# Patient Record
Sex: MALE | Race: WHITE | NOT HISPANIC OR LATINO | URBAN - METROPOLITAN AREA
[De-identification: names, ages, dates, MRNs, and addresses within clinical notes are randomized per-mention and may not be internally consistent; named-entity substitution may affect disease eponyms.]

---
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
## 2019-05-03 ENCOUNTER — APPOINTMENT (OUTPATIENT)
Dept: DERMATOLOGY | Facility: CLINIC | Age: 19
End: 2019-05-03
Payer: COMMERCIAL

## 2019-05-03 DIAGNOSIS — L70.0 ACNE VULGARIS: ICD-10-CM

## 2019-05-03 PROCEDURE — 99213 OFFICE O/P EST LOW 20 MIN: CPT

## 2019-05-03 NOTE — HISTORY OF PRESENT ILLNESS
[de-identified] : Acne;  occasional outbreaks, but less;\par doing well on aczone/tretinoin;   BId

## 2019-05-14 ENCOUNTER — MEDICATION RENEWAL (OUTPATIENT)
Age: 19
End: 2019-05-14

## 2019-05-14 RX ORDER — MINOCYCLINE HYDROCHLORIDE 100 MG/1
100 CAPSULE ORAL
Qty: 60 | Refills: 5 | Status: ACTIVE | COMMUNITY
Start: 2018-07-24 | End: 1900-01-01

## 2019-08-05 ENCOUNTER — APPOINTMENT (OUTPATIENT)
Dept: DERMATOLOGY | Facility: CLINIC | Age: 19
End: 2019-08-05

## 2020-08-01 ENCOUNTER — TRANSCRIPTION ENCOUNTER (OUTPATIENT)
Age: 20
End: 2020-08-01

## 2020-08-12 ENCOUNTER — TRANSCRIPTION ENCOUNTER (OUTPATIENT)
Age: 20
End: 2020-08-12

## 2022-04-13 ENCOUNTER — EMERGENCY (EMERGENCY)
Facility: HOSPITAL | Age: 22
LOS: 0 days | Discharge: HOME | End: 2022-04-13
Attending: HOSPITALIST
Payer: COMMERCIAL

## 2022-04-13 VITALS — HEIGHT: 70 IN | WEIGHT: 175.05 LBS

## 2022-04-13 VITALS
HEART RATE: 85 BPM | RESPIRATION RATE: 17 BRPM | TEMPERATURE: 98 F | DIASTOLIC BLOOD PRESSURE: 66 MMHG | OXYGEN SATURATION: 99 % | SYSTOLIC BLOOD PRESSURE: 117 MMHG

## 2022-04-13 DIAGNOSIS — Z20.822 CONTACT WITH AND (SUSPECTED) EXPOSURE TO COVID-19: ICD-10-CM

## 2022-04-13 DIAGNOSIS — J03.90 ACUTE TONSILLITIS, UNSPECIFIED: ICD-10-CM

## 2022-04-13 DIAGNOSIS — J02.9 ACUTE PHARYNGITIS, UNSPECIFIED: ICD-10-CM

## 2022-04-13 LAB
ADD ON TEST-SPECIMEN IN LAB: SIGNIFICANT CHANGE UP
ALBUMIN SERPL ELPH-MCNC: 4.5 G/DL — SIGNIFICANT CHANGE UP (ref 3.3–5)
ALP SERPL-CCNC: 148 U/L — HIGH (ref 40–120)
ALT FLD-CCNC: 110 U/L — HIGH (ref 12–78)
ANION GAP SERPL CALC-SCNC: 6 MMOL/L — SIGNIFICANT CHANGE UP (ref 5–17)
AST SERPL-CCNC: 56 U/L — HIGH (ref 15–37)
BASOPHILS # BLD AUTO: 0.13 K/UL — SIGNIFICANT CHANGE UP (ref 0–0.2)
BASOPHILS NFR BLD AUTO: 1 % — SIGNIFICANT CHANGE UP (ref 0–2)
BILIRUB SERPL-MCNC: 0.8 MG/DL — SIGNIFICANT CHANGE UP (ref 0.2–1.2)
BUN SERPL-MCNC: 13 MG/DL — SIGNIFICANT CHANGE UP (ref 7–23)
CALCIUM SERPL-MCNC: 10.1 MG/DL — SIGNIFICANT CHANGE UP (ref 8.5–10.1)
CHLORIDE SERPL-SCNC: 100 MMOL/L — SIGNIFICANT CHANGE UP (ref 96–108)
CO2 SERPL-SCNC: 29 MMOL/L — SIGNIFICANT CHANGE UP (ref 22–31)
CREAT SERPL-MCNC: 1.21 MG/DL — SIGNIFICANT CHANGE UP (ref 0.5–1.3)
EGFR: 87 ML/MIN/1.73M2 — SIGNIFICANT CHANGE UP
EOSINOPHIL # BLD AUTO: 0 K/UL — SIGNIFICANT CHANGE UP (ref 0–0.5)
EOSINOPHIL NFR BLD AUTO: 0 % — SIGNIFICANT CHANGE UP (ref 0–6)
FLUAV AG NPH QL: SIGNIFICANT CHANGE UP
FLUBV AG NPH QL: SIGNIFICANT CHANGE UP
GLUCOSE SERPL-MCNC: 98 MG/DL — SIGNIFICANT CHANGE UP (ref 70–99)
HCT VFR BLD CALC: 47 % — SIGNIFICANT CHANGE UP (ref 39–50)
HGB BLD-MCNC: 16 G/DL — SIGNIFICANT CHANGE UP (ref 13–17)
LYMPHOCYTES # BLD AUTO: 1.18 K/UL — SIGNIFICANT CHANGE UP (ref 1–3.3)
LYMPHOCYTES # BLD AUTO: 9 % — LOW (ref 13–44)
MCHC RBC-ENTMCNC: 30.1 PG — SIGNIFICANT CHANGE UP (ref 27–34)
MCHC RBC-ENTMCNC: 34 GM/DL — SIGNIFICANT CHANGE UP (ref 32–36)
MCV RBC AUTO: 88.5 FL — SIGNIFICANT CHANGE UP (ref 80–100)
MONOCYTES # BLD AUTO: 2.09 K/UL — HIGH (ref 0–0.9)
MONOCYTES NFR BLD AUTO: 16 % — HIGH (ref 2–14)
NEUTROPHILS # BLD AUTO: 5.62 K/UL — SIGNIFICANT CHANGE UP (ref 1.8–7.4)
NEUTROPHILS NFR BLD AUTO: 39 % — LOW (ref 43–77)
NRBC # BLD: SIGNIFICANT CHANGE UP /100 WBCS (ref 0–0)
PLATELET # BLD AUTO: 239 K/UL — SIGNIFICANT CHANGE UP (ref 150–400)
POTASSIUM SERPL-MCNC: 4.6 MMOL/L — SIGNIFICANT CHANGE UP (ref 3.5–5.3)
POTASSIUM SERPL-SCNC: 4.6 MMOL/L — SIGNIFICANT CHANGE UP (ref 3.5–5.3)
PROT SERPL-MCNC: 9.8 GM/DL — HIGH (ref 6–8.3)
RBC # BLD: 5.31 M/UL — SIGNIFICANT CHANGE UP (ref 4.2–5.8)
RBC # FLD: 12.9 % — SIGNIFICANT CHANGE UP (ref 10.3–14.5)
RSV RNA NPH QL NAA+NON-PROBE: SIGNIFICANT CHANGE UP
S PYO AG SPEC QL IA: NEGATIVE — SIGNIFICANT CHANGE UP
SARS-COV-2 RNA SPEC QL NAA+PROBE: SIGNIFICANT CHANGE UP
SODIUM SERPL-SCNC: 135 MMOL/L — SIGNIFICANT CHANGE UP (ref 135–145)
WBC # BLD: 13.07 K/UL — HIGH (ref 3.8–10.5)
WBC # FLD AUTO: 13.07 K/UL — HIGH (ref 3.8–10.5)

## 2022-04-13 PROCEDURE — 0241U: CPT

## 2022-04-13 PROCEDURE — 96374 THER/PROPH/DIAG INJ IV PUSH: CPT

## 2022-04-13 PROCEDURE — 36415 COLL VENOUS BLD VENIPUNCTURE: CPT

## 2022-04-13 PROCEDURE — 70491 CT SOFT TISSUE NECK W/DYE: CPT | Mod: MA

## 2022-04-13 PROCEDURE — 85025 COMPLETE CBC W/AUTO DIFF WBC: CPT

## 2022-04-13 PROCEDURE — 80053 COMPREHEN METABOLIC PANEL: CPT

## 2022-04-13 PROCEDURE — 99284 EMERGENCY DEPT VISIT MOD MDM: CPT | Mod: 25

## 2022-04-13 PROCEDURE — 86665 EPSTEIN-BARR CAPSID VCA: CPT

## 2022-04-13 PROCEDURE — 87081 CULTURE SCREEN ONLY: CPT

## 2022-04-13 PROCEDURE — 99285 EMERGENCY DEPT VISIT HI MDM: CPT

## 2022-04-13 PROCEDURE — 87880 STREP A ASSAY W/OPTIC: CPT

## 2022-04-13 PROCEDURE — 70491 CT SOFT TISSUE NECK W/DYE: CPT | Mod: 26,MA

## 2022-04-13 PROCEDURE — 86663 EPSTEIN-BARR ANTIBODY: CPT

## 2022-04-13 PROCEDURE — 86664 EPSTEIN-BARR NUCLEAR ANTIGEN: CPT

## 2022-04-13 PROCEDURE — 96375 TX/PRO/DX INJ NEW DRUG ADDON: CPT

## 2022-04-13 RX ORDER — PIPERACILLIN AND TAZOBACTAM 4; .5 G/20ML; G/20ML
3.38 INJECTION, POWDER, LYOPHILIZED, FOR SOLUTION INTRAVENOUS ONCE
Refills: 0 | Status: COMPLETED | OUTPATIENT
Start: 2022-04-13 | End: 2022-04-13

## 2022-04-13 RX ORDER — ACETAMINOPHEN 500 MG
2 TABLET ORAL
Qty: 0 | Refills: 0 | DISCHARGE

## 2022-04-13 RX ORDER — SODIUM CHLORIDE 9 MG/ML
2000 INJECTION INTRAMUSCULAR; INTRAVENOUS; SUBCUTANEOUS ONCE
Refills: 0 | Status: COMPLETED | OUTPATIENT
Start: 2022-04-13 | End: 2022-04-13

## 2022-04-13 RX ORDER — DEXAMETHASONE 0.5 MG/5ML
10 ELIXIR ORAL ONCE
Refills: 0 | Status: COMPLETED | OUTPATIENT
Start: 2022-04-13 | End: 2022-04-13

## 2022-04-13 RX ORDER — KETOROLAC TROMETHAMINE 30 MG/ML
30 SYRINGE (ML) INJECTION ONCE
Refills: 0 | Status: DISCONTINUED | OUTPATIENT
Start: 2022-04-13 | End: 2022-04-13

## 2022-04-13 RX ORDER — LORATADINE 10 MG/1
1 TABLET ORAL
Qty: 0 | Refills: 0 | DISCHARGE

## 2022-04-13 RX ADMIN — Medication 30 MILLIGRAM(S): at 19:36

## 2022-04-13 RX ADMIN — PIPERACILLIN AND TAZOBACTAM 200 GRAM(S): 4; .5 INJECTION, POWDER, LYOPHILIZED, FOR SOLUTION INTRAVENOUS at 19:40

## 2022-04-13 RX ADMIN — SODIUM CHLORIDE 2000 MILLILITER(S): 9 INJECTION INTRAMUSCULAR; INTRAVENOUS; SUBCUTANEOUS at 19:31

## 2022-04-13 RX ADMIN — Medication 30 MILLIGRAM(S): at 20:00

## 2022-04-13 RX ADMIN — Medication 102 MILLIGRAM(S): at 19:28

## 2022-04-13 NOTE — ED STATDOCS - PROGRESS NOTE DETAILS
22 y/o Male presents to ED with c/o sore throat with difficulty swallowing.  Pt was seen in Urgent Care where rapid strep was (+).  Given PO Decadron and Augmentin without relief.  Pt unable to swallow antibiotics.   Feels voice is muffled.  Neg drooling.  On exam, Oropharynx erythematous with 3-4 (+) tonsils with exudates bilat.  Uvula midline and rises.  Will f/u Labs,CT and re-eval s/p meds.  Yolanda Bray PA-C Tiburcio LOO: patient initially still c/o pain and inability to swallow fluids. discussed admission and transfer to [facility] for ENT eval. initially agreed. spoke with transfer center and ENT resident to arrange for transfer. but then after lengthy discussion with parents, patient and parents decided to wait and try to po challenge again. was able to tolerate jello and arrange for ENT f/u in the morning tomorrow. will dc. called transfer center back to update.

## 2022-04-13 NOTE — ED STATDOCS - NSFOLLOWUPINSTRUCTIONS_ED_ALL_ED_FT
You can take ibuprofen as needed for pain/swelling, 600mg every 6-8 hours, take with food.  Please follow up with ENT tomorrow as we discussed.

## 2022-04-13 NOTE — ED STATDOCS - CLINICAL SUMMARY MEDICAL DECISION MAKING FREE TEXT BOX
21 M with severe tonsillitis and odynophagia. unable to tolerate PO intake. will give abx, fluids, steroids, pain control , CT neck and soft tissue to r/o abscess, reassess and PO challenge

## 2022-04-13 NOTE — ED STATDOCS - OBJECTIVE STATEMENT
21 M no hx here c/o sore throat x 3 days, in known setting of testing positive for strep yesterday. pt states he was seen at urgent care and given Decadron and was prescribed Augmentin. pt states that he is unable ot swallow his saliva and unable to swallow the Augmentin pills . pt's father states that pt's voice sounds muffled. pt sent in the ED due to concern for a retropharyngeal abscess. of note, pt states that he had strep twice before and was treated with Amoxicillin. ENT: Dr. Wise

## 2022-04-13 NOTE — ED STATDOCS - ENMT, MLM
Nasal mucosa clear.  hoarseness of voice. large almost kissing tonsils b/l. no neck rigidity full ROM. not drooling. Mouth with normal mucosa  Throat has no vesicles, no oropharyngeal exudates and uvula is midline.

## 2022-04-13 NOTE — ED STATDOCS - PATIENT PORTAL LINK FT
You can access the FollowMyHealth Patient Portal offered by [facility] by registering at the following website: [email or web address]. By joining [employer]’s FollowMyHealth portal, you will also be able to view your health information using other applications (apps) compatible with our system.

## 2022-04-13 NOTE — ED STATDOCS - NS ED ATTENDING STATEMENT MOD
This was a shared visit with the FRANKO. I reviewed and verified the documentation and independently performed the documented:

## 2022-04-13 NOTE — ED ADULT TRIAGE NOTE - CHIEF COMPLAINT QUOTE
Pt presents to er with complaints of being tested strep pos and sore throat is worse today, states [facility] sent him in for evaluation at this time.

## 2022-04-13 NOTE — PHARMACOTHERAPY INTERVENTION NOTE - COMMENTS
Medication reconciliation completed.  Reviewed Medication list and confirmed med allergies with patient; confirmed with Dr. First Medelsie.

## 2022-04-13 NOTE — ED STATDOCS - NS_ ATTENDINGSCRIBEDETAILS _ED_A_ED_FT
Mary Dey MD: The history, relevant review of systems, past medical and surgical history, medical decision making, and physical examination was documented by the scribe in my presence and I attest to the accuracy of the documentation.

## 2022-04-14 ENCOUNTER — INPATIENT (INPATIENT)
Facility: HOSPITAL | Age: 22
LOS: 3 days | Discharge: HOME | End: 2022-04-18
Attending: HOSPITALIST | Admitting: HOSPITALIST
Payer: COMMERCIAL

## 2022-04-14 VITALS
DIASTOLIC BLOOD PRESSURE: 65 MMHG | HEIGHT: 70 IN | TEMPERATURE: 99 F | HEART RATE: 125 BPM | SYSTOLIC BLOOD PRESSURE: 136 MMHG | OXYGEN SATURATION: 100 % | RESPIRATION RATE: 20 BRPM

## 2022-04-14 DIAGNOSIS — B27.90 INFECTIOUS MONONUCLEOSIS, UNSPECIFIED WITHOUT COMPLICATION: ICD-10-CM

## 2022-04-14 DIAGNOSIS — J03.90 ACUTE TONSILLITIS, UNSPECIFIED: ICD-10-CM

## 2022-04-14 LAB
ALBUMIN SERPL ELPH-MCNC: 4.4 G/DL — SIGNIFICANT CHANGE UP (ref 3.3–5)
ALP SERPL-CCNC: 118 U/L — SIGNIFICANT CHANGE UP (ref 40–120)
ALT FLD-CCNC: 70 U/L — HIGH (ref 4–41)
ANION GAP SERPL CALC-SCNC: 13 MMOL/L — SIGNIFICANT CHANGE UP (ref 7–14)
AST SERPL-CCNC: 46 U/L — HIGH (ref 4–40)
BASOPHILS # BLD AUTO: 0 K/UL — SIGNIFICANT CHANGE UP (ref 0–0.2)
BASOPHILS NFR BLD AUTO: 0 % — SIGNIFICANT CHANGE UP (ref 0–2)
BILIRUB SERPL-MCNC: 0.6 MG/DL — SIGNIFICANT CHANGE UP (ref 0.2–1.2)
BUN SERPL-MCNC: 11 MG/DL — SIGNIFICANT CHANGE UP (ref 7–23)
CALCIUM SERPL-MCNC: 9.4 MG/DL — SIGNIFICANT CHANGE UP (ref 8.4–10.5)
CHLORIDE SERPL-SCNC: 100 MMOL/L — SIGNIFICANT CHANGE UP (ref 98–107)
CO2 SERPL-SCNC: 24 MMOL/L — SIGNIFICANT CHANGE UP (ref 22–31)
CREAT SERPL-MCNC: 1.01 MG/DL — SIGNIFICANT CHANGE UP (ref 0.5–1.3)
EGFR: 108 ML/MIN/1.73M2 — SIGNIFICANT CHANGE UP
EOSINOPHIL # BLD AUTO: 0 K/UL — SIGNIFICANT CHANGE UP (ref 0–0.5)
EOSINOPHIL NFR BLD AUTO: 0 % — SIGNIFICANT CHANGE UP (ref 0–6)
GLUCOSE SERPL-MCNC: 95 MG/DL — SIGNIFICANT CHANGE UP (ref 70–99)
HCT VFR BLD CALC: 41.7 % — SIGNIFICANT CHANGE UP (ref 39–50)
HGB BLD-MCNC: 14.4 G/DL — SIGNIFICANT CHANGE UP (ref 13–17)
IANC: 4.34 K/UL — SIGNIFICANT CHANGE UP (ref 1.8–7.4)
LYMPHOCYTES # BLD AUTO: 41.6 % — SIGNIFICANT CHANGE UP (ref 13–44)
LYMPHOCYTES # BLD AUTO: 5.66 K/UL — HIGH (ref 1–3.3)
MCHC RBC-ENTMCNC: 30.4 PG — SIGNIFICANT CHANGE UP (ref 27–34)
MCHC RBC-ENTMCNC: 34.5 GM/DL — SIGNIFICANT CHANGE UP (ref 32–36)
MCV RBC AUTO: 88 FL — SIGNIFICANT CHANGE UP (ref 80–100)
MONOCYTES # BLD AUTO: 1.09 K/UL — HIGH (ref 0–0.9)
MONOCYTES NFR BLD AUTO: 8 % — SIGNIFICANT CHANGE UP (ref 2–14)
NEUTROPHILS # BLD AUTO: 6.26 K/UL — SIGNIFICANT CHANGE UP (ref 1.8–7.4)
NEUTROPHILS NFR BLD AUTO: 46 % — SIGNIFICANT CHANGE UP (ref 43–77)
PLATELET # BLD AUTO: 206 K/UL — SIGNIFICANT CHANGE UP (ref 150–400)
POTASSIUM SERPL-MCNC: 3.9 MMOL/L — SIGNIFICANT CHANGE UP (ref 3.5–5.3)
POTASSIUM SERPL-SCNC: 3.9 MMOL/L — SIGNIFICANT CHANGE UP (ref 3.5–5.3)
PROT SERPL-MCNC: 7.9 G/DL — SIGNIFICANT CHANGE UP (ref 6–8.3)
RBC # BLD: 4.74 M/UL — SIGNIFICANT CHANGE UP (ref 4.2–5.8)
RBC # FLD: 12.9 % — SIGNIFICANT CHANGE UP (ref 10.3–14.5)
SODIUM SERPL-SCNC: 137 MMOL/L — SIGNIFICANT CHANGE UP (ref 135–145)
WBC # BLD: 13.6 K/UL — HIGH (ref 3.8–10.5)
WBC # FLD AUTO: 13.6 K/UL — HIGH (ref 3.8–10.5)

## 2022-04-14 PROCEDURE — 99223 1ST HOSP IP/OBS HIGH 75: CPT

## 2022-04-14 PROCEDURE — 99285 EMERGENCY DEPT VISIT HI MDM: CPT

## 2022-04-14 RX ORDER — DEXAMETHASONE 0.5 MG/5ML
10 ELIXIR ORAL ONCE
Refills: 0 | Status: COMPLETED | OUTPATIENT
Start: 2022-04-14 | End: 2022-04-14

## 2022-04-14 RX ORDER — KETOROLAC TROMETHAMINE 30 MG/ML
15 SYRINGE (ML) INJECTION ONCE
Refills: 0 | Status: DISCONTINUED | OUTPATIENT
Start: 2022-04-14 | End: 2022-04-14

## 2022-04-14 RX ORDER — SODIUM CHLORIDE 9 MG/ML
1000 INJECTION, SOLUTION INTRAVENOUS
Refills: 0 | Status: DISCONTINUED | OUTPATIENT
Start: 2022-04-14 | End: 2022-04-16

## 2022-04-14 RX ADMIN — Medication 102 MILLIGRAM(S): at 16:07

## 2022-04-14 RX ADMIN — Medication 15 MILLIGRAM(S): at 16:07

## 2022-04-14 RX ADMIN — SODIUM CHLORIDE 75 MILLILITER(S): 9 INJECTION, SOLUTION INTRAVENOUS at 22:04

## 2022-04-14 NOTE — ED PROVIDER NOTE - PROGRESS NOTE DETAILS
Nik PGY3: ENT scoped at bedside, no c/f epiglottitis but states tonsillar edema will take time to go down and should be admitted to medicine for monitoring.

## 2022-04-14 NOTE — ED ADULT NURSE NOTE - OBJECTIVE STATEMENT
PT came to ED c/o pain and swelling to tonsils.  PT A&OX4.  PT's mom stated he went to [facility] last night to the same symptoms, was given steroids and IVF, then d/c.  Swelling and pain got worse today so PT came here.  No SOB noted.  PT able to move all extremities.  Resp WDL.  Will continue to monitor. PT came to ED c/o pain and swelling to tonsils.  PT A&OX4.  PT's mom stated he went to [facility] last night to the same symptoms, was given steroids and IVF, then d/c.  Swelling and pain got worse today so PT came here.  PT noted with white swelling in tonsils.  PT c/o pain to tonsils, stated he has difficulty swallowing food and drinks.  No SOB noted.  PT able to move all extremities.  Resp WDL.  Left 20g IVL in place and tolerating well.  Will continue to monitor.

## 2022-04-14 NOTE — ED PROVIDER NOTE - OBJECTIVE STATEMENT
20 y/o M w/ [facility] p/w tonsillar swelling x 2 days. Patient seen at [place] yestrday, CT shows enlarged tonsils w/ b/l edema. Patient was given steroids in ED and was told that he might have to be admitted. Patient opted not to, tolerated PO and went home. Awoke feeling worse, and came into ER. Patient endorses sore throat, fever and chills. No cp, or sob.

## 2022-04-14 NOTE — ED ADULT TRIAGE NOTE - ARRIVAL FROM
[facility]                                                                       Query Response Note      PATIENT:               MEGHAN VILLELA  ACCT #:                  [id number]  MRN:                     [id number]  :                      1958  ADMIT DATE:       3/24/2020 1:16 PM  DISCH DATE:        2020 3:01 PM  RESPONDING  PROVIDER #:        [id number]           QUERY TEXT:    Pt has documented __Sepsis_ noted in Progress Notes 3/26 and 3/27/2020.  Please clarify status of this condition:    NOTE:  If an appropriate response is not listed below, please respond with a new note.       The patient's Clinical Indicators include:  Admitted with HIV not on meds for at least 2 years. Chest xray concerning for PJP pneumonia.  Progress note 3/26/2020 states: has shortness of breath coughing and found to be febrile.  Presumed PJP pneumonia  Sepsis  Acute hypoxemic respiratory failure.    Temp 35.8  / Pulse 93  /   Resp 20  /  BP  106/73  WBC 5,300  IV  Unasyn and azithromycin  Options provided:   -- Sepsis ruled in, present on admit   -- Sepsis ruled in, developed after admit   -- Sepsis ruled out   -- Unable to determine      Query created by: Yasmin Hendrickson on 2020 6:13 PM    RESPONSE TEXT:    Sepsis ruled in, present on admit          Electronically signed by:  MIMI ABDULLAHI MD 2020 7:17 PM              
                                                                         [facility]                                                                       Query Response Note      PATIENT:               MEGHAN VILLELA  ACCT #:                  [id number]  MRN:                     [id number]  :                      1958  ADMIT DATE:       3/24/2020 1:16 PM  DISCH DATE:        2020 3:01 PM  RESPONDING  PROVIDER #:        [id number]           QUERY TEXT:    Please clarify in documentation the relationship, if any, between Sepsis and AIDS.    *If an appropriate response is not listed below, please respond with a new note:    The patient's Clinical Indicators include:  61 y.o. with history of AIDS admitted for evaluation of progressive malaise.and fatigue associated with significant weight loss of about 100 pounds over the past 6 months.  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  Sepsis documented in Progress Note 3/26/2020.  Has oropharyngeal candidiasis being treated on fluconazole and nystatin.  Options provided:   -- Sepsis is due to or associated with AIDS   -- Unrelated to each other   -- Unable to determine      Query created by: Yasmin Hendrickson on 2020 2:46 PM    RESPONSE TEXT:    Sepsis is due to or associated with AIDS  Sepsis is secondary to pneumocystis jirovecii pneumonia, but AIDS puts him at high risk of opportunistic infections like pneumocystis jirovecii pneumonia          Electronically signed by:  MIMI ABDULLAHI MD 2020 3:05 PM              
Home

## 2022-04-14 NOTE — CONSULT NOTE ADULT - SUBJECTIVE AND OBJECTIVE BOX
CC: swollen tonsils    HPI: 20 y/o male w no significant pmhx presented to [facility] ED for tonsilar swelling x 2 days. Yesterday pt went to [facility] ED  and CT shows enlarged tonsils w/ b/l edema. Patient was given steroids at [facility] ED and was told that he might have to be admitted. Patient opted not to, tolerated PO and went home. Pt went to [facility] ED for worsting of symptoms.  At [facility] ED pt tachycardic 125-118, tmax 99.7, normotensive and 100% on RA  positive for EBV. ENT consulted for r/o epiglottis. Pt seen and examined at bedside. Pt expressed feeling short of breath.  Pt endorsed pain in throat, chills/fever, inability to swallow food and spitting up secretions.        PAST MEDICAL & SURGICAL HISTORY:  No pertinent past medical history      Allergies    No Known Allergies    Intolerances      MEDICATIONS  (STANDING):    MEDICATIONS  (PRN):      Social History: No known sick contacts     Family history: No pertinent family history in first degree relatives    ROS:   ENT: all negative except as noted in HPI   CV: denies palpitations  Pulm: denies cough, hemoptysis  GI: denies indigestion, n/v  : denies pertinent urinary symptoms, urgency  Neuro: denies numbness/tingling, loss of sensation  Psych: denies anxiety  MS: denies muscle weakness, instability  Heme: denies easy bruising or bleeding  Endo: denies heat/cold intolerance, excessive sweating  Vascular: denies LE edema    Vital Signs Last 24 Hrs  T(C): 37.6 (14 Apr 2022 15:44), Max: 37.6 (14 Apr 2022 15:44)  T(F): 99.7 (14 Apr 2022 15:44), Max: 99.7 (14 Apr 2022 15:44)  HR: 118 (14 Apr 2022 15:44) (85 - 125)  BP: 136/84 (14 Apr 2022 15:44) (117/66 - 136/84)  BP(mean): 99 (13 Apr 2022 17:15) (99 - 99)  RR: 18 (14 Apr 2022 15:44) (17 - 20)  SpO2: 100% (14 Apr 2022 15:44) (99% - 100%)                          14.4   13.60 )-----------( 206      ( 14 Apr 2022 16:13 )             41.7    04-14    137  |  100  |  x   ----------------------------<  x   3.9   |  x   |  x     Ca    10.1      13 Apr 2022 17:58    TPro  9.8<H>  /  Alb  4.5  /  TBili  0.8  /  DBili  x   /  AST  56<H>  /  ALT  110<H>  /  AlkPhos  148<H>  04-13       PHYSICAL EXAM:  Gen: NAD, sitting up in bed  Skin: No rashes, bruises, or lesions  Head: Normocephalic, Atraumatic  Face: no edema, erythema, or fluctuance.    Eyes: no scleral injection  Ears: Right - external ear without abnormalities             Left - external ear without abnormalities   Nose: Nares bilaterally patent, no discharge  Mouth:  4+ tonsils with exudates noted.  No stridor, no trismus.  Mucosa moist, tongue/uvula midline   Neck:  B/L cervical lymphadenopathy present and tender to palpation. Flat, supple, trachea midline, no masses  Lymphatic: B/L cervical lymphadenopathy present  Resp: breathing easily, no stridor  CV: no peripheral edema/cyanosis  GI: nondistended   Peripheral vascular: no JVD or edema  Neuro: facial nerve intact, no facial droop           Fiberoptic Indirect laryngoscopy:  (Scope #2 used)  Flexible laryngoscopy was performed and revealed the following:    -- Nasopharynx had no mass or exudate.    -- Posterior pharyngeal wall clear, no edema, and no erythema. Tonsils visualized with exudates     -- Base of tongue was symmetric and not enlarged with mild white exudates     -- Vallecula was clear    -- Epiglottis, both aryepiglottic folds and both false vocal folds were normal    -- Arytenoids both without edema and erythema     -- True vocal folds were fully mobile and without lesions.     -- Post cricoid area clear, no edema and no erythema    -- Interarytenoid edema was absent    -- Airway patent    IMAGING/ADDITIONAL STUDIES:   rad< from: CT Neck Soft Tissue w/ IV Cont (04.13.22 @ 18:20) >  ACC: [id number] EXAM:  CT NECK SOFT TISSUE IC                          PROCEDURE DATE:  04/13/2022          INTERPRETATION:  CT neck with IV contrast      CLINICAL INFORMATION: Abscess    TECHNIQUE:  Contiguous axial 3 mm thick sections were obtainedthrough   the neck using single helical acquisition.  Images were acquired during   the intravenous administration of 95 cc of Omnipaque 350/ 5 cc discarded.    Image data was reconstructed in the 3 mm sagittal and coronal planes.    This scan was performed using automatic exposure control (radiation dose   reduction software) to obtain a diagnostic image quality scan with   patient dose as low as reasonably achievable.    FINDINGS:   No prior similar studies are available for review.    No neck mass is found.  No pathologically enlarged lymph nodes are found.   Reactive cervical lymph nodes are noted The visualized lymph nodes   demonstrate no central necrosis or extranodal extension.    The mucosal surfaces of the upper aerodigestive tract demonstrate   enlarged tonsils with no discrete abscess. There is BILATERAL edema which   may reflect early abscess formation. The larynx is intact.  The   preepiglottic and paralaryngeal spaces are intact.  Laryngeal cartilages   remain intact.    The nasopharynx is hypertrophied.  No lateral retropharyngeal mass is   found.  The underlying central skull base is intact.  The petrous   temporal bones and mastoids remain clear.  The visualized base of brain   appears unremarkable.    The parotid and submandibular glands are intact.  The thyroid gland is   intact.    The visualized paranasal sinuses are significant for minimal mucosal   thickening in the LEFT maxillary sinus.  The nasal cavity is unremarkable.    The cervical spine appears intact.    The carotid and vertebral arteries demonstrate enhancement indicating   their patency.    The lung apices are clear, allowing for the for the neck CT technique.      IMPRESSION: Enlarged tonsils with no discrete abscess, but with BILATERAL   edema which may reflect early abscess formation.   Adenoidal hypertrophy.   Reactive cervical lymphadenopathy.    --- End of Report ---            < end of copied text >  < from: CT Neck Soft Tissue w/ IV Cont (04.13.22 @ 18:20) >  MERCEDES JADE MD; Attending Radiologist  This document has been electronically signed. Apr 13 2022  7:17PM    < end of copied text >

## 2022-04-14 NOTE — CONSULT NOTE ADULT - PROBLEM SELECTOR RECOMMENDATION 9
- Recommend Decadron 8mg q8hrs x 3 doses   - IV Clindamycin 600mg q8, can transition to PO clindamycin 300 TID once ready for discharge for a total of 10 days  - f/u cultures   - Encouraging PO intake, if unable to tolerate, please give IVF  - magica mouth wash 10 ml every 8 hours swish and spit  - Discussed with attending

## 2022-04-14 NOTE — ED PROVIDER NOTE - NS ED ROS FT
General: +fever, chills  HENT: no nasal congestion, +sore throat  Eyes: no visual changes, no blurred vision  Neck: no neck pain  CV: denies chest pain, no palpitations  Resp: no difficulty breathing, no cough  Abdominal: no nausea, no vomiting, no diarrhea, no abdominal pain, no blood in stool, no dark stool  MSK: no muscle aches, no leg pain, no leg swelling  Neuro: no headaches  Skin: no rashes

## 2022-04-14 NOTE — ED PROVIDER NOTE - PHYSICAL EXAMINATION
General: well appearing   HEENT: significant tonsillar swelling b/l, mild exudates  Cardiovascular: Normal s1, s2, RRR  Respiratory: CTA b/l   Abdominal: Soft, ntnd  Extremities: No swelling in LEs  Neurologic: Non focal  Psych: Awake, alert answering questions appropriately

## 2022-04-14 NOTE — ED PROVIDER NOTE - CLINICAL SUMMARY MEDICAL DECISION MAKING FREE TEXT BOX
20 y/o M w/ b/l tonsillar swelling and + EBV likely 2/2 mono however c/f epiglotitis. Labs, meds, ENT. Dispo pending.

## 2022-04-14 NOTE — ED PROVIDER NOTE - ATTENDING CONTRIBUTION TO CARE
Dr. Hamm: 20 yo male with no sig PMH, in ED with few days of worsening throat pain.  Was seen at urgent care and then [facility] yesterday, EBV serology positive for acute infection, given steroids and offered transfer for symptomatic care, but tolerated PO and wanted to go home and so was sent home.  Today pt continues with sore throat and difficulty tolerating secretions and food, and so came back to ED.  He has felt chills and subjective fever.  No N/V/D.  On exam pt uncomfortable appearing, in moderate distress due to throat pain, heart RRR, lungs CTAB, abd NTND, extremities without swelling, strength 5/5 in all extremities and skin without rash.  Throat exam showing kissing tonsils covered in white exudates, with midline uvula and no tongue elevation.  +bilateral cervical lymphadenopathy.  CT from yesterday reviewed--tonsillitis, cannot exclude early abscess.  No throat pain with movement of neck. Dr. Hamm: 20 yo male with no sig PMH, in ED with few days of worsening throat pain.  Was seen at urgent care and then [facility] yesterday, EBV serology positive for acute infection, given steroids and offered transfer for symptomatic care, but tolerated PO and wanted to go home and so was sent home.  Today pt continues with sore throat and difficulty tolerating secretions and food, and so came back to ED.  He has felt chills and subjective fever.  No N/V/D.  On exam pt uncomfortable appearing, in moderate distress due to throat pain, heart RRR, lungs CTAB, abd NTND, extremities without swelling, strength 5/5 in all extremities and skin without rash.  Throat exam showing kissing tonsils covered in white exudates, with midline uvula and no tongue elevation.  +bilateral cervical lymphadenopathy.  CT from yesterday reviewed--tonsillitis, cannot exclude early abscess.  No throat pain with movement of neck

## 2022-04-14 NOTE — ED ADULT TRIAGE NOTE - CHIEF COMPLAINT QUOTE
Patient c/o sore throat, swollen tonsils , difficulty breathing and difficulty swallowing. Voice is muffled in triage, tonsils appear very swollen and possibly touching.

## 2022-04-14 NOTE — CONSULT NOTE ADULT - ASSESSMENT
22 y/o male w no significant pmhx presented to [facility] ED for tonsilar swelling x 2 days. Yesterday pt went to [place] [facility] ED   and CT shows enlarged tonsils w/ b/l edema. Patient was given steroids at [facility] ED and was told that he might have to be admitted. Patient opted not to, tolerated PO and went home. Pt went to [facility] ED for worsting of symptoms.  At [facility] ED pt tachycardic 125-118, tmax 99.7, normotensive and 100% on RA  positive for EBV.  Physical exam revealed 4+ tonsils with exudates, airway patent, no epiglottis enlargement and no pooling of secretions.

## 2022-04-15 DIAGNOSIS — Z29.9 ENCOUNTER FOR PROPHYLACTIC MEASURES, UNSPECIFIED: ICD-10-CM

## 2022-04-15 DIAGNOSIS — B27.90 INFECTIOUS MONONUCLEOSIS, UNSPECIFIED WITHOUT COMPLICATION: ICD-10-CM

## 2022-04-15 DIAGNOSIS — R74.01 ELEVATION OF LEVELS OF LIVER TRANSAMINASE LEVELS: ICD-10-CM

## 2022-04-15 DIAGNOSIS — D72.829 ELEVATED WHITE BLOOD CELL COUNT, UNSPECIFIED: ICD-10-CM

## 2022-04-15 LAB
A1C WITH ESTIMATED AVERAGE GLUCOSE RESULT: 4.7 % — SIGNIFICANT CHANGE UP (ref 4–5.6)
ALBUMIN SERPL ELPH-MCNC: 3.9 G/DL — SIGNIFICANT CHANGE UP (ref 3.3–5)
ALP SERPL-CCNC: 102 U/L — SIGNIFICANT CHANGE UP (ref 40–120)
ALT FLD-CCNC: 54 U/L — HIGH (ref 4–41)
ANION GAP SERPL CALC-SCNC: 14 MMOL/L — SIGNIFICANT CHANGE UP (ref 7–14)
AST SERPL-CCNC: 30 U/L — SIGNIFICANT CHANGE UP (ref 4–40)
BILIRUB SERPL-MCNC: 0.6 MG/DL — SIGNIFICANT CHANGE UP (ref 0.2–1.2)
BUN SERPL-MCNC: 11 MG/DL — SIGNIFICANT CHANGE UP (ref 7–23)
CALCIUM SERPL-MCNC: 9.2 MG/DL — SIGNIFICANT CHANGE UP (ref 8.4–10.5)
CHLORIDE SERPL-SCNC: 102 MMOL/L — SIGNIFICANT CHANGE UP (ref 98–107)
CHOLEST SERPL-MCNC: 154 MG/DL — SIGNIFICANT CHANGE UP
CO2 SERPL-SCNC: 22 MMOL/L — SIGNIFICANT CHANGE UP (ref 22–31)
CREAT SERPL-MCNC: 0.86 MG/DL — SIGNIFICANT CHANGE UP (ref 0.5–1.3)
EGFR: 126 ML/MIN/1.73M2 — SIGNIFICANT CHANGE UP
ESTIMATED AVERAGE GLUCOSE: 88 — SIGNIFICANT CHANGE UP
GLUCOSE SERPL-MCNC: 86 MG/DL — SIGNIFICANT CHANGE UP (ref 70–99)
HCT VFR BLD CALC: 38.2 % — LOW (ref 39–50)
HDLC SERPL-MCNC: 24 MG/DL — LOW
HGB BLD-MCNC: 12.8 G/DL — LOW (ref 13–17)
HIV 1+2 AB+HIV1 P24 AG SERPL QL IA: SIGNIFICANT CHANGE UP
LIPID PNL WITH DIRECT LDL SERPL: 102 MG/DL — HIGH
MAGNESIUM SERPL-MCNC: 1.9 MG/DL — SIGNIFICANT CHANGE UP (ref 1.6–2.6)
MCHC RBC-ENTMCNC: 30.1 PG — SIGNIFICANT CHANGE UP (ref 27–34)
MCHC RBC-ENTMCNC: 33.5 GM/DL — SIGNIFICANT CHANGE UP (ref 32–36)
MCV RBC AUTO: 89.9 FL — SIGNIFICANT CHANGE UP (ref 80–100)
NON HDL CHOLESTEROL: 130 MG/DL — HIGH
NRBC # BLD: 0 /100 WBCS — SIGNIFICANT CHANGE UP
NRBC # FLD: 0 K/UL — SIGNIFICANT CHANGE UP
PHOSPHATE SERPL-MCNC: 3.3 MG/DL — SIGNIFICANT CHANGE UP (ref 2.5–4.5)
PLATELET # BLD AUTO: 213 K/UL — SIGNIFICANT CHANGE UP (ref 150–400)
POTASSIUM SERPL-MCNC: 4 MMOL/L — SIGNIFICANT CHANGE UP (ref 3.5–5.3)
POTASSIUM SERPL-SCNC: 4 MMOL/L — SIGNIFICANT CHANGE UP (ref 3.5–5.3)
PROT SERPL-MCNC: 7.1 G/DL — SIGNIFICANT CHANGE UP (ref 6–8.3)
RBC # BLD: 4.25 M/UL — SIGNIFICANT CHANGE UP (ref 4.2–5.8)
RBC # FLD: 13 % — SIGNIFICANT CHANGE UP (ref 10.3–14.5)
SODIUM SERPL-SCNC: 138 MMOL/L — SIGNIFICANT CHANGE UP (ref 135–145)
TRIGL SERPL-MCNC: 138 MG/DL — SIGNIFICANT CHANGE UP
TSH SERPL-MCNC: 0.57 UIU/ML — SIGNIFICANT CHANGE UP (ref 0.27–4.2)
WBC # BLD: 11.43 K/UL — HIGH (ref 3.8–10.5)
WBC # FLD AUTO: 11.43 K/UL — HIGH (ref 3.8–10.5)

## 2022-04-15 PROCEDURE — 12345: CPT | Mod: NC,GC

## 2022-04-15 PROCEDURE — 93010 ELECTROCARDIOGRAM REPORT: CPT

## 2022-04-15 PROCEDURE — 99223 1ST HOSP IP/OBS HIGH 75: CPT

## 2022-04-15 PROCEDURE — 99222 1ST HOSP IP/OBS MODERATE 55: CPT | Mod: 25

## 2022-04-15 PROCEDURE — 99222 1ST HOSP IP/OBS MODERATE 55: CPT

## 2022-04-15 RX ORDER — LACTOBACILLUS ACIDOPHILUS 100MM CELL
1 CAPSULE ORAL THREE TIMES A DAY
Refills: 0 | Status: COMPLETED | OUTPATIENT
Start: 2022-04-15 | End: 2022-04-18

## 2022-04-15 RX ORDER — KETOROLAC TROMETHAMINE 30 MG/ML
15 SYRINGE (ML) INJECTION EVERY 8 HOURS
Refills: 0 | Status: DISCONTINUED | OUTPATIENT
Start: 2022-04-15 | End: 2022-04-18

## 2022-04-15 RX ORDER — LANOLIN ALCOHOL/MO/W.PET/CERES
3 CREAM (GRAM) TOPICAL AT BEDTIME
Refills: 0 | Status: DISCONTINUED | OUTPATIENT
Start: 2022-04-15 | End: 2022-04-18

## 2022-04-15 RX ORDER — SODIUM CHLORIDE 9 MG/ML
1000 INJECTION, SOLUTION INTRAVENOUS ONCE
Refills: 0 | Status: COMPLETED | OUTPATIENT
Start: 2022-04-15 | End: 2022-04-15

## 2022-04-15 RX ORDER — ACETAMINOPHEN 500 MG
650 TABLET ORAL EVERY 6 HOURS
Refills: 0 | Status: DISCONTINUED | OUTPATIENT
Start: 2022-04-15 | End: 2022-04-18

## 2022-04-15 RX ORDER — DEXAMETHASONE 0.5 MG/5ML
8 ELIXIR ORAL EVERY 8 HOURS
Refills: 0 | Status: COMPLETED | OUTPATIENT
Start: 2022-04-15 | End: 2022-04-15

## 2022-04-15 RX ORDER — ONDANSETRON 8 MG/1
4 TABLET, FILM COATED ORAL EVERY 8 HOURS
Refills: 0 | Status: DISCONTINUED | OUTPATIENT
Start: 2022-04-15 | End: 2022-04-15

## 2022-04-15 RX ORDER — DIPHENHYDRAMINE HYDROCHLORIDE AND LIDOCAINE HYDROCHLORIDE AND ALUMINUM HYDROXIDE AND MAGNESIUM HYDRO
10 KIT EVERY 8 HOURS
Refills: 0 | Status: DISCONTINUED | OUTPATIENT
Start: 2022-04-15 | End: 2022-04-18

## 2022-04-15 RX ORDER — KETOROLAC TROMETHAMINE 30 MG/ML
15 SYRINGE (ML) INJECTION ONCE
Refills: 0 | Status: DISCONTINUED | OUTPATIENT
Start: 2022-04-15 | End: 2022-04-15

## 2022-04-15 RX ADMIN — SODIUM CHLORIDE 125 MILLILITER(S): 9 INJECTION, SOLUTION INTRAVENOUS at 20:04

## 2022-04-15 RX ADMIN — Medication 100 MILLIGRAM(S): at 00:27

## 2022-04-15 RX ADMIN — Medication 15 MILLIGRAM(S): at 04:18

## 2022-04-15 RX ADMIN — Medication 1 TABLET(S): at 22:52

## 2022-04-15 RX ADMIN — Medication 15 MILLIGRAM(S): at 04:33

## 2022-04-15 RX ADMIN — Medication 100 MILLIGRAM(S): at 13:25

## 2022-04-15 RX ADMIN — Medication 100 MILLIGRAM(S): at 22:52

## 2022-04-15 RX ADMIN — DIPHENHYDRAMINE HYDROCHLORIDE AND LIDOCAINE HYDROCHLORIDE AND ALUMINUM HYDROXIDE AND MAGNESIUM HYDRO 10 MILLILITER(S): KIT at 22:53

## 2022-04-15 RX ADMIN — Medication 1 TABLET(S): at 13:25

## 2022-04-15 RX ADMIN — SODIUM CHLORIDE 1000 MILLILITER(S): 9 INJECTION, SOLUTION INTRAVENOUS at 09:14

## 2022-04-15 RX ADMIN — Medication 101.6 MILLIGRAM(S): at 08:33

## 2022-04-15 RX ADMIN — DIPHENHYDRAMINE HYDROCHLORIDE AND LIDOCAINE HYDROCHLORIDE AND ALUMINUM HYDROXIDE AND MAGNESIUM HYDRO 10 MILLILITER(S): KIT at 13:26

## 2022-04-15 RX ADMIN — Medication 100 MILLIGRAM(S): at 07:21

## 2022-04-15 RX ADMIN — Medication 101.6 MILLIGRAM(S): at 14:13

## 2022-04-15 NOTE — H&P ADULT - PROBLEM SELECTOR PLAN 5
- DVT Ppx: Intermittent pneumatic compression devices  - Diet: NPO  - Activity: Ambulate as tolerated

## 2022-04-15 NOTE — H&P ADULT - PROBLEM SELECTOR PLAN 2
- DVT Ppx: Intermittent pneumatic compression devices  - Diet: NPO  - Activity: Ambulate as tolerated -as above

## 2022-04-15 NOTE — H&P ADULT - NSHPPHYSICALEXAM_GEN_ALL_CORE
PHYSICAL EXAM:  General: Awake and alert.  No acute distress.  Head: Normocephalic, atraumatic.    Eyes: PERRL.  EOMI.  No scleral icterus.  No conjunctival pallor.  Mouth: Tonsils with exudates noted. Moist mucosa. tongue/trachea midline  Neck: b/l cervical lymphadenopathy present and tender to palpation. Supple. Trachea midline  Heart: RRR.  Normal S1 and S2.  No murmurs, rubs, or gallops.  No LE edema b/l.   Lungs: Nonlabored breathing.  Good inspiratory effort.  CTAB.  No wheezes, crackles, or rhonchi.    Abdomen: BS+, soft, NT/ND.  No hepatomegaly.   Skin: Warm and dry.  No rashes.  Extremities: No cyanosis.  2+ peripheral pulses b/l.  Musculoskeletal: No joint deformities.  No spinal or paraspinal tenderness.  Neuro: A&Ox4.  CN II-XII intact.  5/5 motor strength in UE and LE b/l.  Tactile sensation intact in UE and LE b/l.  Cerebellar function intact as assessed by finger-to-nose test.

## 2022-04-15 NOTE — H&P ADULT - ASSESSMENT
Patient is a 20 y/o male w no significant pmhx who presents to [facility] ED for tonsilar pain and swelling. Patient reports that he started having tonsillar pain when he woke up 5 days ago with progression of tonsillar swelling that started 4 days ago. CT revaeals enlarged tonsils w/ b/l edema. Admitted to medicine for tonsillitis.

## 2022-04-15 NOTE — PROGRESS NOTE ADULT - SUBJECTIVE AND OBJECTIVE BOX
ENT ISSUE/POD: Tonsillitis    HPI: Patient seen and examed at bedside. Did not get 2nd dose of decadron overnight per patient, but got his 2nd dose this morning, reports his voice and neck swelling is much better in comparison to yesterday. Able to tolerates clear liquid diet without issue. Denies SOB, chest pain, fever and chills.         PAST MEDICAL & SURGICAL HISTORY:  No pertinent past medical history    No significant past surgical history      Allergies    No Known Allergies    Intolerances      MEDICATIONS  (STANDING):  clindamycin IVPB 600 milliGRAM(s) IV Intermittent every 8 hours  dexAMETHasone  IVPB 8 milliGRAM(s) IV Intermittent every 8 hours  FIRST- Mouthwash  BLM 10 milliLiter(s) Swish and Spit every 8 hours  lactated ringers. 1000 milliLiter(s) (125 mL/Hr) IV Continuous <Continuous>    MEDICATIONS  (PRN):  acetaminophen     Tablet .. 650 milliGRAM(s) Oral every 6 hours PRN Temp greater or equal to 38C (100.4F), Mild Pain (1 - 3)  aluminum hydroxide/magnesium hydroxide/simethicone Suspension 30 milliLiter(s) Oral every 4 hours PRN Dyspepsia  ketorolac   Injectable 15 milliGRAM(s) IV Push every 8 hours PRN Moderate Pain (4 - 6)  melatonin 3 milliGRAM(s) Oral at bedtime PRN Insomnia  ondansetron Injectable 4 milliGRAM(s) IV Push every 8 hours PRN Nausea and/or Vomiting      Social History: see consult note    Family history: see consult note    ROS:   ENT: all negative except as noted in HPI   Pulm: denies SOB, cough, hemoptysis  Neuro: denies numbness/tingling, loss of sensation  Endo: denies heat/cold intolerance, excessive sweating      Vital Signs Last 24 Hrs  T(C): 36.2 (15 Apr 2022 04:16), Max: 37.6 (14 Apr 2022 15:44)  T(F): 97.2 (15 Apr 2022 04:16), Max: 99.7 (14 Apr 2022 15:44)  HR: 93 (15 Apr 2022 04:16) (93 - 125)  BP: 119/69 (15 Apr 2022 04:16) (109/66 - 136/84)  BP(mean): --  RR: 16 (15 Apr 2022 04:16) (16 - 20)  SpO2: 97% (15 Apr 2022 04:16) (97% - 100%)                          12.8   11.43 )-----------( 213      ( 15 Apr 2022 07:33 )             38.2    04-15    138  |  102  |  11  ----------------------------<  86  4.0   |  22  |  0.86    Ca    9.2      15 Apr 2022 07:33  Phos  3.3     04-15  Mg     1.90     04-15    TPro  7.1  /  Alb  3.9  /  TBili  0.6  /  DBili  x   /  AST  30  /  ALT  54<H>  /  AlkPhos  102  04-15       PHYSICAL EXAM:  Gen: NAD  Skin: No rashes, bruises, or lesions  Head: Normocephalic, Atraumatic  Face: no edema, erythema, or fluctuance. Parotid glands soft without mass  Eyes: no scleral injection  Ears: Bilateral ears no evidence of any fluid drainage. No mastoid tenderness, erythema, or ear bulging  Nose: Nares bilaterally patent, no discharge  Mouth: + bilateral 4+ tonsils with whitish exudate, erythematous oropharynx. No Stridor / Drooling / Trismus.  Mucosa moist, tongue/uvula midline  Neck: Flat, supple, no lymphadenopathy, trachea midline, no masses  Lymphatic: No lymphadenopathy  Resp: breathing easily, no stridor  Neuro: facial nerve intact, no facial droop

## 2022-04-15 NOTE — H&P ADULT - NSHPREVIEWOFSYSTEMS_GEN_ALL_CORE
Constitutional Symptoms: No weakness, fevers, chills, weight loss  Eyes: No visual changes, headache, eye pain, double vision  Ears, Nose, Mouth, Throat: No runny nose, sinus pain, ear pain, tinnitus, sore throat, dysphagia, odynophagia  Cardiovascular: No chest pain, palpitations, edema  Respiratory: No cough, wheezing, hemoptysis,   Gastrointestinal: + Odynophagia,   No abdominal pain, anorexia, nausea/vomiting, diarrhea/constipation, hematemesis, BRBPR, melena  Genitourinary: No dysuria, frequency, hematuria  Musculoskeletal: No joint pain, joint swelling, decreased ROM  Skin: No pruritus, rashes, lesions, wounds  Neurologic:  No seizures, headache, paraesthesia, numbness, limb weakness  Psychiatric: No depression, anxiety, difficulty concentrating, anhedonia, lack of energy  Endocrine: No heat/cold intolerance, mood swings, sweats, polydipsia, polyuria  Hematologic/lymphatic: No purpura, petechia, prolonged or excessive bleeding after dental extraction / injury, use of anticoagulant and antiplatelet drugs (including aspirin)  Allergic/Immunologic: No anaphylaxis, allergic response to materials, foods, animals    Positives and pertinent negatives noted and all other systems negative.

## 2022-04-15 NOTE — H&P ADULT - NSICDXFAMILYHX_GEN_ALL_CORE_FT
FAMILY HISTORY:  Father  Still living? Unknown  Family hx of hypertension, Age at diagnosis: Age Unknown    Mother  Still living? Unknown  Family hx of hypertension, Age at diagnosis: Age Unknown

## 2022-04-15 NOTE — PROGRESS NOTE ADULT - PROBLEM SELECTOR PLAN 3
-likely from EBV continue to monitor -likely from EBV  -improving, no role for further work at this time

## 2022-04-15 NOTE — PROGRESS NOTE ADULT - PROBLEM SELECTOR PLAN 1
- Recommend Decadron 8mg q8hrs x 3 doses   - IV Clindamycin 600mg q8, can transition to PO clindamycin 300 TID once ready for discharge for a total of 10 days  - f/u cultures   - Encouraging PO intake, if unable to tolerate, please give IVF  - Can continue clear liquid diet and advance as tolerated tonight  - Magica mouth wash 10 ml every 8 hours swish and spit  - Case discussed with Dr. Engel

## 2022-04-15 NOTE — PROGRESS NOTE ADULT - ATTENDING COMMENTS
21M with throat pain d/t acute tonsillitis in s/o acute EBV infection (mononucleosis).  CT c/f tonsillitis, poss early tonsillar abscess.    A/P   mononucleosis with tonsillitis and transaminitis   -iv clindamycin, decadron x2 doses per ENT, lactobacillus  -advance diet as tolerated  -ivf  -If not improving w/ abx then consider repeat CT neck to r/o tonsillar abscess in which case it will need to be drained 21M with throat pain d/t acute tonsillitis in s/o acute EBV infection (mononucleosis).  CT c/f tonsillitis with edema, poss early tonsillar abscess.  PE notable for enlarged tonsillitis w/ white exudates    A/P   mononucleosis with tonsillitis and transaminitis   -ENT recs appreciated  -iv clindamycin, decadron x2 doses per ENT, lactobacillus  -advance diet as tolerated  -ivf hydration  -If not improving w/ abx then consider repeat CT neck to r/o tonsillar abscess which would require drainage

## 2022-04-15 NOTE — PROGRESS NOTE ADULT - ASSESSMENT
Patient is a 22 y/o male w no significant pmhx who presents to [facility] ED for tonsilar pain and swelling. Patient reports that he started having tonsillar pain when he woke up 5 days ago with progression of tonsillar swelling that started 4 days ago. CT revaeals enlarged tonsils w/ b/l edema. Admitted to medicine for tonsillitis.  Patient is a 22 y/o male w no significant pmhx who presents to [facility] ED for tonsilar pain and swelling, w/ CT findings of enlarged tonsils w/ b/l edema, admitted for IV abx and pain control for tonsilitis.

## 2022-04-15 NOTE — PROGRESS NOTE ADULT - SUBJECTIVE AND OBJECTIVE BOX
**************************************************************  Alan Roblero, PGY3  Internal Medicine   pager: NS: [phone] [facility]: [phone]  ***************************************************************    PROGRESS NOTE:     Patient is a 21y old  Male who presents with a chief complaint of Tonsillitis (15 Apr 2022 05:50)      SUBJECTIVE / OVERNIGHT EVENTS:    ADDITIONAL REVIEW OF SYSTEMS: 10 point ROS negative except per HPI    MEDICATIONS  (STANDING):  clindamycin IVPB 600 milliGRAM(s) IV Intermittent every 8 hours  dexAMETHasone  IVPB 8 milliGRAM(s) IV Intermittent every 8 hours  FIRST- Mouthwash  BLM 10 milliLiter(s) Swish and Spit every 8 hours  lactated ringers. 1000 milliLiter(s) (75 mL/Hr) IV Continuous <Continuous>    MEDICATIONS  (PRN):  acetaminophen     Tablet .. 650 milliGRAM(s) Oral every 6 hours PRN Temp greater or equal to 38C (100.4F), Mild Pain (1 - 3)  aluminum hydroxide/magnesium hydroxide/simethicone Suspension 30 milliLiter(s) Oral every 4 hours PRN Dyspepsia  ketorolac   Injectable 15 milliGRAM(s) IV Push every 8 hours PRN Moderate Pain (4 - 6)  melatonin 3 milliGRAM(s) Oral at bedtime PRN Insomnia  ondansetron Injectable 4 milliGRAM(s) IV Push every 8 hours PRN Nausea and/or Vomiting      CAPILLARY BLOOD GLUCOSE        I&O's Summary      PHYSICAL EXAM:  Vital Signs Last 24 Hrs  T(C): 36.6 (14 Apr 2022 22:02), Max: 37.6 (14 Apr 2022 15:44)  T(F): 97.9 (14 Apr 2022 22:02), Max: 99.7 (14 Apr 2022 15:44)  HR: 96 (14 Apr 2022 22:02) (96 - 125)  BP: 109/66 (14 Apr 2022 22:02) (109/66 - 136/84)  BP(mean): --  RR: 16 (14 Apr 2022 22:02) (16 - 20)  SpO2: 98% (14 Apr 2022 22:02) (97% - 100%)    CONSTITUTIONAL: NAD, well-developed  RESPIRATORY: Normal respiratory effort; lungs are clear to auscultation bilaterally  CARDIOVASCULAR: Regular rate and rhythm, normal S1 and S2, no murmur/rub/gallop; No lower extremity edema; Peripheral pulses are 2+ bilaterally  ABDOMEN: Nontender to palpation, normoactive bowel sounds, no rebound/guarding; No hepatosplenomegaly  MUSCLOSKELETAL: no clubbing or cyanosis of digits; no joint swelling or tenderness to palpation  NEURO: CN 2-12 grossly intact, moves all limbs spontaneously  PSYCH: A+O to person, place, and time; affect appropriate    LABS:                        14.4   13.60 )-----------( 206      ( 14 Apr 2022 16:13 )             41.7     04-14    137  |  100  |  11  ----------------------------<  95  3.9   |  24  |  1.01    Ca    9.4      14 Apr 2022 16:13    TPro  7.9  /  Alb  4.4  /  TBili  0.6  /  DBili  x   /  AST  46<H>  /  ALT  70<H>  /  AlkPhos  118  04-14                RADIOLOGY & ADDITIONAL TESTS:  Results Reviewed:   Imaging Personally Reviewed:  Electrocardiogram Personally Reviewed:    COORDINATION OF CARE:  Care Discussed with Consultants/Other Providers [Y/N]:  Prior or Outpatient Records Reviewed [Y/N]:   **************************************************************  Alan Roblero, PGY3  Internal Medicine   pager: NS: [phone] [facility]: [phone]  ***************************************************************    PROGRESS NOTE:     Patient is a 21y old  Male who presents with a chief complaint of Tonsillitis (15 Apr 2022 05:50)  SUBJECTIVE / OVERNIGHT EVENTS:  -No acute events overnight. This Am, patient feels that his neck pain has improved significantly, and is minimal at this point. Still feels very fatigued. Denies any shortness of breath, nausea, vomiting, headache, chest pain.     ADDITIONAL REVIEW OF SYSTEMS: 10 point ROS negative except per HPI    MEDICATIONS  (STANDING):  clindamycin IVPB 600 milliGRAM(s) IV Intermittent every 8 hours  dexAMETHasone  IVPB 8 milliGRAM(s) IV Intermittent every 8 hours  FIRST- Mouthwash  BLM 10 milliLiter(s) Swish and Spit every 8 hours  lactated ringers. 1000 milliLiter(s) (75 mL/Hr) IV Continuous <Continuous>    MEDICATIONS  (PRN):  acetaminophen     Tablet .. 650 milliGRAM(s) Oral every 6 hours PRN Temp greater or equal to 38C (100.4F), Mild Pain (1 - 3)  aluminum hydroxide/magnesium hydroxide/simethicone Suspension 30 milliLiter(s) Oral every 4 hours PRN Dyspepsia  ketorolac   Injectable 15 milliGRAM(s) IV Push every 8 hours PRN Moderate Pain (4 - 6)  melatonin 3 milliGRAM(s) Oral at bedtime PRN Insomnia  ondansetron Injectable 4 milliGRAM(s) IV Push every 8 hours PRN Nausea and/or Vomiting      CAPILLARY BLOOD GLUCOSE        I&O's Summary      PHYSICAL EXAM:  Vital Signs Last 24 Hrs  T(C): 36.6 (14 Apr 2022 22:02), Max: 37.6 (14 Apr 2022 15:44)  T(F): 97.9 (14 Apr 2022 22:02), Max: 99.7 (14 Apr 2022 15:44)  HR: 96 (14 Apr 2022 22:02) (96 - 125)  BP: 109/66 (14 Apr 2022 22:02) (109/66 - 136/84)  BP(mean): --  RR: 16 (14 Apr 2022 22:02) (16 - 20)  SpO2: 98% (14 Apr 2022 22:02) (97% - 100%)    General: Awake and alert.  No acute distress.  Head: Normocephalic, atraumatic.    Eyes: PERRL.  EOMI.  No scleral icterus.  No conjunctival pallor.  Mouth: Tonsils with exudates noted. Moist mucosa. tongue/trachea midline  Neck: b/l cervical lymphadenopathy present and tender to palpation. Supple. Trachea midline  Heart: RRR.  Normal S1 and S2.  No murmurs, rubs, or gallops.  No LE edema b/l.   Lungs:  CTAB.  No wheezes, crackles, or rhonchi.    Abdomen: BS+, soft, NT/ND.   Skin: Warm and dry.  No rashes.  Musculoskeletal: MESSER, no edema   Neuro: A&Ox4, non focal  LABS:                        14.4   13.60 )-----------( 206      ( 14 Apr 2022 16:13 )             41.7     04-14    137  |  100  |  11  ----------------------------<  95  3.9   |  24  |  1.01    Ca    9.4      14 Apr 2022 16:13    TPro  7.9  /  Alb  4.4  /  TBili  0.6  /  DBili  x   /  AST  46<H>  /  ALT  70<H>  /  AlkPhos  118  04-14      RADIOLOGY & ADDITIONAL TESTS:  Results Reviewed: yes  Imaging Personally Reviewed: yes  Electrocardiogram Personally Reviewed: yes    COORDINATION OF CARE:  Care Discussed with Consultants/Other Providers [Y/N]: ENT **************************************************************  Alan Roblero, PGY3  Internal Medicine   pager: NS: [phone] [facility]: [phone]  ***************************************************************    PROGRESS NOTE:     Patient is a 21y old  Male who presents with a chief complaint of Tonsillitis (15 Apr 2022 05:50)  SUBJECTIVE / OVERNIGHT EVENTS:  -No acute events overnight. This Am, patient feels that his neck pain has improved significantly, and is minimal at this point. Still feels very fatigued. Denies any shortness of breath, nausea, vomiting, headache, chest pain.     ADDITIONAL REVIEW OF SYSTEMS: 10 point ROS negative except per HPI    MEDICATIONS  (STANDING):  clindamycin IVPB 600 milliGRAM(s) IV Intermittent every 8 hours  dexAMETHasone  IVPB 8 milliGRAM(s) IV Intermittent every 8 hours  FIRST- Mouthwash  BLM 10 milliLiter(s) Swish and Spit every 8 hours  lactated ringers. 1000 milliLiter(s) (75 mL/Hr) IV Continuous <Continuous>    MEDICATIONS  (PRN):  acetaminophen     Tablet .. 650 milliGRAM(s) Oral every 6 hours PRN Temp greater or equal to 38C (100.4F), Mild Pain (1 - 3)  aluminum hydroxide/magnesium hydroxide/simethicone Suspension 30 milliLiter(s) Oral every 4 hours PRN Dyspepsia  ketorolac   Injectable 15 milliGRAM(s) IV Push every 8 hours PRN Moderate Pain (4 - 6)  melatonin 3 milliGRAM(s) Oral at bedtime PRN Insomnia  ondansetron Injectable 4 milliGRAM(s) IV Push every 8 hours PRN Nausea and/or Vomiting      CAPILLARY BLOOD GLUCOSE        I&O's Summary      PHYSICAL EXAM:  Vital Signs Last 24 Hrs  T(C): 36.6 (14 Apr 2022 22:02), Max: 37.6 (14 Apr 2022 15:44)  T(F): 97.9 (14 Apr 2022 22:02), Max: 99.7 (14 Apr 2022 15:44)  HR: 96 (14 Apr 2022 22:02) (96 - 125)  BP: 109/66 (14 Apr 2022 22:02) (109/66 - 136/84)  BP(mean): --  RR: 16 (14 Apr 2022 22:02) (16 - 20)  SpO2: 98% (14 Apr 2022 22:02) (97% - 100%)    General: Awake and alert.  No acute distress.  Head: Normocephalic, atraumatic.    Eyes: PERRL.  EOMI.  No scleral icterus.  No conjunctival pallor.  Mouth: Tonsils with exudates noted. Moist mucosa. tongue/trachea midline  Neck: b/l cervical lymphadenopathy present and tender to palpation. Supple. Trachea midline  Heart: RRR.  Normal S1 and S2.  No murmurs, rubs, or gallops.  No LE edema b/l.   Lungs:  CTAB.  No wheezes, crackles, or rhonchi.    Abdomen: BS+, soft, NT/ND.   Skin: Warm and dry.  No rashes.  Musculoskeletal: MESSER, no edema   Neuro: A&Ox4, non focal  LABS:                        14.4   13.60 )-----------( 206      ( 14 Apr 2022 16:13 )             41.7     04-14    137  |  100  |  11  ----------------------------<  95  3.9   |  24  |  1.01    Ca    9.4      14 Apr 2022 16:13    TPro  7.9  /  Alb  4.4  /  TBili  0.6  /  DBili  x   /  AST  46<H>  /  ALT  70<H>  /  AlkPhos  118  04-14      RADIOLOGY & ADDITIONAL TESTS:  Results Reviewed: yes  Imaging Personally Reviewed: yes  < from: CT Neck Soft Tissue w/ IV Cont (04.13.22 @ 18:20) >  IMPRESSION: Enlarged tonsils with no discrete abscess, but with BILATERAL   edema which may reflect early abscess formation.   Adenoidal hypertrophy.   Reactive cervical lymphadenopathy.      Electrocardiogram Personally Reviewed: yes    COORDINATION OF CARE:  Care Discussed with Consultants/Other Providers [Y/N]: ENT

## 2022-04-15 NOTE — H&P ADULT - NSHPLABSRESULTS_GEN_ALL_CORE
EKG   Labs:                        14.4   13.60 )-----------( 206      ( 14 Apr 2022 16:13 )             41.7     04-14    137  |  100  |  11  ----------------------------<  95  3.9   |  24  |  1.01    Ca    9.4      14 Apr 2022 16:13    TPro  7.9  /  Alb  4.4  /  TBili  0.6  /  DBili  x   /  AST  46<H>  /  ALT  70<H>  /  AlkPhos  118  04-14      Urinanalysis Basic (04-15-22 @ 03:37):    RADIOLOGY  CT Neck soft tissue w/ IV contrast: Enlarged tonsils with no discrete abscess, but with BILATERAL   edema which may reflect early abscess formation.   Adenoidal hypertrophy.   Reactive cervical lymphadenopathy. EKG: Normal Sinus Rhythm HR 91bpm. .  Labs:                        14.4   13.60 )-----------( 206      ( 14 Apr 2022 16:13 )             41.7     04-14    137  |  100  |  11  ----------------------------<  95  3.9   |  24  |  1.01    Ca    9.4      14 Apr 2022 16:13    TPro  7.9  /  Alb  4.4  /  TBili  0.6  /  DBili  x   /  AST  46<H>  /  ALT  70<H>  /  AlkPhos  118  04-14      Urinanalysis Basic (04-15-22 @ 03:37):    RADIOLOGY  CT Neck soft tissue w/ IV contrast: Enlarged tonsils with no discrete abscess, but with BILATERAL   edema which may reflect early abscess formation.   Adenoidal hypertrophy.   Reactive cervical lymphadenopathy.

## 2022-04-15 NOTE — H&P ADULT - NSHPSOCIALHISTORY_GEN_ALL_CORE
Tobacco Usage:  (x) never smoked   ( ) former smoker  ( ) current smoker; Packs per day:   Alcohol Usage: ( ) none  ( ) occasional ( x ) 2-3 times a week ( ) daily; Last drink:   Recreational drugs (x) None  Lives with family   Ambulates without assistance  Recent Travel to [place] last week

## 2022-04-15 NOTE — PROGRESS NOTE ADULT - PROBLEM SELECTOR PLAN 5
- DVT Ppx: Intermittent pneumatic compression devices  - Diet: NPO  - Activity: Ambulate as tolerated - DVT Ppx: Intermittent pneumatic compression devices  - Diet: NPO, likely will trial foods this PM  - Activity: Ambulate as tolerated

## 2022-04-15 NOTE — PROGRESS NOTE ADULT - NS ATTEND AMEND GEN_ALL_CORE FT
Recommend Decadron 8mg q8hrs x 3 doses   - IV Clindamycin 600mg q8, can transition to PO clindamycin 300 TID once ready for discharge for a total of 10 days  - f/u cultures   - Encouraging PO intake, if unable to tolerate, please give IVF  - Can continue clear liquid diet and advance as tolerated tonight    Agree with above assessment and plan  Thank you for your referral  Esperanza Engel MD

## 2022-04-15 NOTE — PROGRESS NOTE ADULT - ASSESSMENT
20 y/o male w no significant pmhx presented to [facility] ED for tonsilar swelling x 2 days. Yesterday pt went to [place] ED   and CT shows enlarged tonsils w/ b/l edema. Patient was given steroids at [place] ED and was told that he might have to be admitted. Patient opted not to, tolerated PO and went home. Pt went to [facility] ED for worsting of symptoms.  At [facility] ED pt tachycardic 125-118, tmax 99.7, normotensive and 100% on RA  positive for EBV.  Physical exam revealed 4+ tonsils with exudates, airway patent, no epiglottis enlargement and no pooling of secretions. WBC trading down from 13 to 11. Symptoms improved with decadron and IV clindamycin. Culture is pending

## 2022-04-15 NOTE — PROGRESS NOTE ADULT - SUBJECTIVE AND OBJECTIVE BOX
ENT Progress Note  Pt seen and examined at the bedside. Pt states that he was feeling better after decadron was gien but now that it wore off, he is having more pain. Still without any respiratory symptoms.         PAST MEDICAL & SURGICAL HISTORY:  No pertinent past medical history    No significant past surgical history      Allergies    No Known Allergies    Intolerances      MEDICATIONS  (STANDING):  clindamycin IVPB 600 milliGRAM(s) IV Intermittent every 8 hours  lactated ringers. 1000 milliLiter(s) (75 mL/Hr) IV Continuous <Continuous>    MEDICATIONS  (PRN):  acetaminophen     Tablet .. 650 milliGRAM(s) Oral every 6 hours PRN Temp greater or equal to 38C (100.4F), Mild Pain (1 - 3)  aluminum hydroxide/magnesium hydroxide/simethicone Suspension 30 milliLiter(s) Oral every 4 hours PRN Dyspepsia  ketorolac   Injectable 15 milliGRAM(s) IV Push every 8 hours PRN Moderate Pain (4 - 6)  melatonin 3 milliGRAM(s) Oral at bedtime PRN Insomnia  ondansetron Injectable 4 milliGRAM(s) IV Push every 8 hours PRN Nausea and/or Vomiting        Vital Signs Last 24 Hrs  T(C): 36.6 (14 Apr 2022 22:02), Max: 37.6 (14 Apr 2022 15:44)  T(F): 97.9 (14 Apr 2022 22:02), Max: 99.7 (14 Apr 2022 15:44)  HR: 96 (14 Apr 2022 22:02) (96 - 125)  BP: 109/66 (14 Apr 2022 22:02) (109/66 - 136/84)  BP(mean): --  RR: 16 (14 Apr 2022 22:02) (16 - 20)  SpO2: 98% (14 Apr 2022 22:02) (97% - 100%)    Physical Exam:  PHYSICAL EXAM:  Gen: NAD, sitting up in bed  Skin: No rashes, bruises, or lesions  Head: Normocephalic, Atraumatic  Face: no edema, erythema, or fluctuance.    Eyes: no scleral injection  Ears: Right - external ear without abnormalities             Left - external ear without abnormalities   Nose: Nares bilaterally patent, no discharge  Mouth:  4+ tonsils with exudates noted.  No stridor, no trismus.  Mucosa moist, tongue/uvula midline   Neck:  B/L cervical lymphadenopathy present and tender to palpation. Flat, supple, trachea midline, no masses  Lymphatic: B/L cervical lymphadenopathy present  Resp: breathing easily, no stridor  CV: no peripheral edema/cyanosis  GI: nondistended   Peripheral vascular: no JVD or edema  Neuro: facial nerve intact, no facial droop    Nonlabored Respirations RA  MESSER                              14.4   13.60 )-----------( 206      ( 14 Apr 2022 16:13 )             41.7    04-14    137  |  100  |  11  ----------------------------<  95  3.9   |  24  |  1.01    Ca    9.4      14 Apr 2022 16:13    TPro  7.9  /  Alb  4.4  /  TBili  0.6  /  DBili  x   /  AST  46<H>  /  ALT  70<H>  /  AlkPhos  118  04-14         A/P: 21yMale  20 y/o male w no significant pmhx presented to [facility] ED for tonsilar swelling x 2 days. Yesterday pt went to [place] ED   and CT shows enlarged tonsils w/ b/l edema. Patient was given steroids at [place] ED and was told that he might have to be admitted. Patient opted not to, tolerated PO and went home. Pt went to [facility] ED for worsting of symptoms.  At [facility] ED pt tachycardic 125-118, tmax 99.7, normotensive and 100% on RA  positive for EBV.  Physical exam revealed 4+ tonsils with exudates, airway patent, no epiglottis enlargement and no pooling of secretions.         - Recommend Decadron 8mg q8hrs x 3 doses   - IV Clindamycin 600mg q8, can transition to PO clindamycin 300 TID once ready for discharge for a total of 10 days  - f/u cultures   - Encouraging PO intake, if unable to tolerate, please give IVF  - magica mouth wash 10 ml every 8 hours swish and spit  - Discussed with attending.

## 2022-04-15 NOTE — PROGRESS NOTE ADULT - PROBLEM SELECTOR PLAN 1
- ENT consulted. Recs appreciated.   - c/w Decadron 8mg q8hrs x 3 doses (did not get any doses since 4/14 in the PM)  - IV Clindamycin 600mg q8, can transition to PO clindamycin 300 TID once ready for discharge for a total of 10 days  - f/u cultures   - Encouraging PO intake, if unable to tolerate, please give IVF  - magic mouth wash 10 ml every 8 hours swish and spit  - Can start Zosyn if symptoms worsen and no relief with Clindamycin   - HIV testing for positive EBV  - will keep NPO for now and c/w fluids Likely 2/2 EBV infection  - ENT consulted. Recs appreciated.   - c/w Decadron 8mg q8hrs x 3 doses (did not get any doses since 4/14 in the PM), ENT will re-evaluate the need for more steroids today  - IV Clindamycin 600mg q8, can transition to PO clindamycin 300 TID once ready for discharge for a total of 10 days. I discussed switching to unasyn, but ENT would like to keep clinda given broader coverage/penetration   - f/u cultures   - Encouraging PO intake, if unable to tolerate, increased IVF to 125/hr given weight  - magic mouth wash 10 ml every 8 hours swish and spit (first dose will be given this AM)  - HIV negative

## 2022-04-15 NOTE — H&P ADULT - HISTORY OF PRESENT ILLNESS
Patient is a 22 y/o male w no significant pmhx who presents to [facility] ED for tonsilar pain and swelling. Patient reports that he started having tonsillar pain when he woke up 5 days ago with progression of tonsillar swelling that started 4 days ago. Yesterday pt went to [place] ED and CT showed enlarged tonsils w/ b/l edema. As per patient, he was given steroids at [place] ED and was told to see an ENT specialist. He came to [facility] ED for worsening of symptoms.  At [facility] ED pt tachycardic 125-118, tmax 99.7,  normotensive and 100% on RA  positive for EBV. ENT consulted for r/o epiglottitis Pt seen and examined at bedside. Pt expressed feeling short of breath.  Pt endorsed increased salivation, pain in throat, chills/fever, inability to swallow food and spitting up secretions.  Patient denies chest pain, palpitations, wheezing, stridor, hives, n/v, urinary frequency, constipation and diarrhea.     ED Course: Vitals on arrival: Temp: 99.7F, HR 118bpm, /84, RR 18, SPO2: 100% RA. EKG:   CT Neck soft tissue w/ IV contrast: Enlarged tonsils with no discrete abscess, but with BILATERAL edema which may reflect early abscess formation.   Adenoidal hypertrophy. Reactive cervical lymphadenopathy.  Leonel Barr Virus: Positive  He was treated with Clindamycin 600mg IVPB, Dexamethasone 10mg IVPB for 30 min x1, and Toradol 15mg IVP x1     Patient is a 20 y/o male w no significant pmhx who presents to [facility] ED for tonsilar pain and swelling. Patient reports that he started having tonsillar pain when he woke up 5 days ago with progression of tonsillar swelling that started 4 days ago. Yesterday pt went to [place] ED and CT showed enlarged tonsils w/ b/l edema. As per patient, he was given steroids at [place] ED and was told to see an ENT specialist. He came to [facility] ED for worsening of symptoms.  At [facility] ED pt tachycardic 125-118, tmax 99.7,  normotensive and 100% on RA  positive for EBV. ENT consulted for r/o epiglottitis Pt seen and examined at bedside. Pt expressed feeling short of breath.  Pt endorsed increased salivation, pain in throat, chills/fever, inability to swallow food and spitting up secretions.  Patient denies chest pain, palpitations, wheezing, stridor, hives, n/v, urinary frequency, constipation and diarrhea.     ED Course: Vitals on arrival: Temp: 99.7F, HR 118bpm, /84, RR 18, SPO2: 100% RA. EKG: Normal Sinus Rhythm HR 91bpm. . CT Neck soft tissue w/ IV contrast: Enlarged tonsils with no discrete abscess, but with BILATERAL edema which may reflect early abscess formation.   Adenoidal hypertrophy. Reactive cervical lymphadenopathy.  Leonel Barr Virus: Positive  He was treated with Clindamycin 600mg IVPB, Dexamethasone 10mg IVPB for 30 min x1, and Toradol 15mg IVP x1

## 2022-04-15 NOTE — PATIENT PROFILE ADULT - FALL HARM RISK - HARM RISK INTERVENTIONS

## 2022-04-15 NOTE — H&P ADULT - PROBLEM SELECTOR PLAN 1
- ENT consulted. Recs appreciated.   - c/w Decadron 8mg q8hrs x 3 doses   - IV Clindamycin 600mg q8, can transition to PO clindamycin 300 TID once ready for discharge for a total of 10 days  - f/u cultures   - Encouraging PO intake, if unable to tolerate, please give IVF  - magic mouth wash 10 ml every 8 hours swish and spit  - Can start Zosyn if symptoms worsen and no relief with Clindamycin   - HIV testing for positive EBV  - will keep NPO for now and c/w fluids

## 2022-04-16 ENCOUNTER — TRANSCRIPTION ENCOUNTER (OUTPATIENT)
Age: 22
End: 2022-04-16

## 2022-04-16 LAB
ALBUMIN SERPL ELPH-MCNC: 3.9 G/DL — SIGNIFICANT CHANGE UP (ref 3.3–5)
ALP SERPL-CCNC: 97 U/L — SIGNIFICANT CHANGE UP (ref 40–120)
ALT FLD-CCNC: 44 U/L — HIGH (ref 4–41)
ANION GAP SERPL CALC-SCNC: 12 MMOL/L — SIGNIFICANT CHANGE UP (ref 7–14)
AST SERPL-CCNC: 23 U/L — SIGNIFICANT CHANGE UP (ref 4–40)
BASOPHILS # BLD AUTO: 0.12 K/UL — SIGNIFICANT CHANGE UP (ref 0–0.2)
BASOPHILS NFR BLD AUTO: 1.1 % — SIGNIFICANT CHANGE UP (ref 0–2)
BILIRUB SERPL-MCNC: 0.5 MG/DL — SIGNIFICANT CHANGE UP (ref 0.2–1.2)
BLD GP AB SCN SERPL QL: NEGATIVE — SIGNIFICANT CHANGE UP
BUN SERPL-MCNC: 8 MG/DL — SIGNIFICANT CHANGE UP (ref 7–23)
CALCIUM SERPL-MCNC: 9.1 MG/DL — SIGNIFICANT CHANGE UP (ref 8.4–10.5)
CHLORIDE SERPL-SCNC: 101 MMOL/L — SIGNIFICANT CHANGE UP (ref 98–107)
CO2 SERPL-SCNC: 24 MMOL/L — SIGNIFICANT CHANGE UP (ref 22–31)
CREAT SERPL-MCNC: 0.85 MG/DL — SIGNIFICANT CHANGE UP (ref 0.5–1.3)
CULTURE RESULTS: SIGNIFICANT CHANGE UP
EGFR: 126 ML/MIN/1.73M2 — SIGNIFICANT CHANGE UP
EOSINOPHIL # BLD AUTO: 0.31 K/UL — SIGNIFICANT CHANGE UP (ref 0–0.5)
EOSINOPHIL NFR BLD AUTO: 2.9 % — SIGNIFICANT CHANGE UP (ref 0–6)
GLUCOSE SERPL-MCNC: 83 MG/DL — SIGNIFICANT CHANGE UP (ref 70–99)
HCT VFR BLD CALC: 38.9 % — LOW (ref 39–50)
HGB BLD-MCNC: 13.2 G/DL — SIGNIFICANT CHANGE UP (ref 13–17)
IANC: 2.83 K/UL — SIGNIFICANT CHANGE UP (ref 1.8–7.4)
IMM GRANULOCYTES NFR BLD AUTO: 0.4 % — SIGNIFICANT CHANGE UP (ref 0–1.5)
LYMPHOCYTES # BLD AUTO: 57 % — HIGH (ref 13–44)
LYMPHOCYTES # BLD AUTO: 6 K/UL — HIGH (ref 1–3.3)
MAGNESIUM SERPL-MCNC: 2 MG/DL — SIGNIFICANT CHANGE UP (ref 1.6–2.6)
MCHC RBC-ENTMCNC: 30 PG — SIGNIFICANT CHANGE UP (ref 27–34)
MCHC RBC-ENTMCNC: 33.9 GM/DL — SIGNIFICANT CHANGE UP (ref 32–36)
MCV RBC AUTO: 88.4 FL — SIGNIFICANT CHANGE UP (ref 80–100)
MONOCYTES # BLD AUTO: 1.23 K/UL — HIGH (ref 0–0.9)
MONOCYTES NFR BLD AUTO: 11.7 % — SIGNIFICANT CHANGE UP (ref 2–14)
NEUTROPHILS # BLD AUTO: 2.83 K/UL — SIGNIFICANT CHANGE UP (ref 1.8–7.4)
NEUTROPHILS NFR BLD AUTO: 26.9 % — LOW (ref 43–77)
NRBC # BLD: 0 /100 WBCS — SIGNIFICANT CHANGE UP
NRBC # FLD: 0 K/UL — SIGNIFICANT CHANGE UP
PHOSPHATE SERPL-MCNC: 2.6 MG/DL — SIGNIFICANT CHANGE UP (ref 2.5–4.5)
PLATELET # BLD AUTO: 240 K/UL — SIGNIFICANT CHANGE UP (ref 150–400)
POTASSIUM SERPL-MCNC: 3.9 MMOL/L — SIGNIFICANT CHANGE UP (ref 3.5–5.3)
POTASSIUM SERPL-SCNC: 3.9 MMOL/L — SIGNIFICANT CHANGE UP (ref 3.5–5.3)
PROT SERPL-MCNC: 7.2 G/DL — SIGNIFICANT CHANGE UP (ref 6–8.3)
RBC # BLD: 4.4 M/UL — SIGNIFICANT CHANGE UP (ref 4.2–5.8)
RBC # FLD: 12.7 % — SIGNIFICANT CHANGE UP (ref 10.3–14.5)
RH IG SCN BLD-IMP: POSITIVE — SIGNIFICANT CHANGE UP
SODIUM SERPL-SCNC: 137 MMOL/L — SIGNIFICANT CHANGE UP (ref 135–145)
SPECIMEN SOURCE: SIGNIFICANT CHANGE UP
WBC # BLD: 10.53 K/UL — HIGH (ref 3.8–10.5)
WBC # FLD AUTO: 10.53 K/UL — HIGH (ref 3.8–10.5)

## 2022-04-16 PROCEDURE — 99233 SBSQ HOSP IP/OBS HIGH 50: CPT | Mod: GC

## 2022-04-16 PROCEDURE — 99232 SBSQ HOSP IP/OBS MODERATE 35: CPT

## 2022-04-16 RX ORDER — SODIUM CHLORIDE 9 MG/ML
1000 INJECTION, SOLUTION INTRAVENOUS
Refills: 0 | Status: DISCONTINUED | OUTPATIENT
Start: 2022-04-16 | End: 2022-04-18

## 2022-04-16 RX ORDER — IBUPROFEN 200 MG
1 TABLET ORAL
Qty: 42 | Refills: 0
Start: 2022-04-16 | End: 2022-04-29

## 2022-04-16 RX ORDER — DEXAMETHASONE 0.5 MG/5ML
8 ELIXIR ORAL EVERY 8 HOURS
Refills: 0 | Status: COMPLETED | OUTPATIENT
Start: 2022-04-17 | End: 2022-04-17

## 2022-04-16 RX ORDER — DEXAMETHASONE 0.5 MG/5ML
8 ELIXIR ORAL ONCE
Refills: 0 | Status: COMPLETED | OUTPATIENT
Start: 2022-04-16 | End: 2022-04-16

## 2022-04-16 RX ORDER — KETOROLAC TROMETHAMINE 30 MG/ML
30 SYRINGE (ML) INJECTION ONCE
Refills: 0 | Status: DISCONTINUED | OUTPATIENT
Start: 2022-04-16 | End: 2022-04-16

## 2022-04-16 RX ORDER — SODIUM CHLORIDE 9 MG/ML
1000 INJECTION, SOLUTION INTRAVENOUS
Refills: 0 | Status: DISCONTINUED | OUTPATIENT
Start: 2022-04-16 | End: 2022-04-16

## 2022-04-16 RX ORDER — DIPHENHYDRAMINE HYDROCHLORIDE AND LIDOCAINE HYDROCHLORIDE AND ALUMINUM HYDROXIDE AND MAGNESIUM HYDRO
10 KIT
Qty: 420 | Refills: 0
Start: 2022-04-16 | End: 2022-04-29

## 2022-04-16 RX ORDER — MORPHINE SULFATE 50 MG/1
2 CAPSULE, EXTENDED RELEASE ORAL ONCE
Refills: 0 | Status: DISCONTINUED | OUTPATIENT
Start: 2022-04-16 | End: 2022-04-16

## 2022-04-16 RX ORDER — LACTOBACILLUS ACIDOPHILUS 100MM CELL
1 CAPSULE ORAL
Qty: 42 | Refills: 0
Start: 2022-04-16 | End: 2022-04-29

## 2022-04-16 RX ADMIN — Medication 1 TABLET(S): at 21:49

## 2022-04-16 RX ADMIN — MORPHINE SULFATE 2 MILLIGRAM(S): 50 CAPSULE, EXTENDED RELEASE ORAL at 14:25

## 2022-04-16 RX ADMIN — SODIUM CHLORIDE 125 MILLILITER(S): 9 INJECTION, SOLUTION INTRAVENOUS at 05:24

## 2022-04-16 RX ADMIN — SODIUM CHLORIDE 125 MILLILITER(S): 9 INJECTION, SOLUTION INTRAVENOUS at 14:58

## 2022-04-16 RX ADMIN — Medication 1 TABLET(S): at 09:19

## 2022-04-16 RX ADMIN — DIPHENHYDRAMINE HYDROCHLORIDE AND LIDOCAINE HYDROCHLORIDE AND ALUMINUM HYDROXIDE AND MAGNESIUM HYDRO 10 MILLILITER(S): KIT at 13:09

## 2022-04-16 RX ADMIN — Medication 30 MILLIGRAM(S): at 16:59

## 2022-04-16 RX ADMIN — SODIUM CHLORIDE 125 MILLILITER(S): 9 INJECTION, SOLUTION INTRAVENOUS at 09:19

## 2022-04-16 RX ADMIN — Medication 101.6 MILLIGRAM(S): at 16:51

## 2022-04-16 RX ADMIN — Medication 15 MILLIGRAM(S): at 05:24

## 2022-04-16 RX ADMIN — SODIUM CHLORIDE 125 MILLILITER(S): 9 INJECTION, SOLUTION INTRAVENOUS at 05:29

## 2022-04-16 RX ADMIN — Medication 1 TABLET(S): at 13:09

## 2022-04-16 RX ADMIN — Medication 30 MILLIGRAM(S): at 16:01

## 2022-04-16 RX ADMIN — DIPHENHYDRAMINE HYDROCHLORIDE AND LIDOCAINE HYDROCHLORIDE AND ALUMINUM HYDROXIDE AND MAGNESIUM HYDRO 10 MILLILITER(S): KIT at 05:24

## 2022-04-16 RX ADMIN — MORPHINE SULFATE 2 MILLIGRAM(S): 50 CAPSULE, EXTENDED RELEASE ORAL at 15:45

## 2022-04-16 RX ADMIN — Medication 100 MILLIGRAM(S): at 21:45

## 2022-04-16 RX ADMIN — Medication 101.6 MILLIGRAM(S): at 23:11

## 2022-04-16 RX ADMIN — SODIUM CHLORIDE 125 MILLILITER(S): 9 INJECTION, SOLUTION INTRAVENOUS at 20:46

## 2022-04-16 RX ADMIN — Medication 3 MILLIGRAM(S): at 21:50

## 2022-04-16 RX ADMIN — DIPHENHYDRAMINE HYDROCHLORIDE AND LIDOCAINE HYDROCHLORIDE AND ALUMINUM HYDROXIDE AND MAGNESIUM HYDRO 10 MILLILITER(S): KIT at 21:43

## 2022-04-16 RX ADMIN — Medication 15 MILLIGRAM(S): at 05:39

## 2022-04-16 RX ADMIN — Medication 100 MILLIGRAM(S): at 13:08

## 2022-04-16 RX ADMIN — Medication 100 MILLIGRAM(S): at 05:25

## 2022-04-16 NOTE — PROGRESS NOTE ADULT - SUBJECTIVE AND OBJECTIVE BOX
INTERVAL HX:  Patient seen and examined at the bedside. States minimal improvement in symptoms, still having difficulty swallowing. Culture negative.     Vital Signs Last 24 Hrs  T(C): 36.6 (16 Apr 2022 05:57), Max: 36.7 (15 Apr 2022 15:00)  T(F): 97.8 (16 Apr 2022 05:57), Max: 98.1 (15 Apr 2022 15:00)  HR: 82 (16 Apr 2022 05:57) (82 - 100)  BP: 122/72 (16 Apr 2022 05:57) (118/64 - 128/76)  BP(mean): --  RR: 16 (16 Apr 2022 05:57) (16 - 18)  SpO2: 98% (16 Apr 2022 05:57) (98% - 100%)    General: AAOx3, resting in bed, comfortable  Neck: soft and flat  NC: clear anteriorly   OC: tongue normal, FOM normal, OP with 4+ tonsils with gray thick exudate    A/P:   22 y/o male w no significant pmhx presented to [facility] ED for tonsilar swelling x 2 days. Yesterday pt went to [place] ED   and CT shows enlarged tonsils w/ b/l edema. Patient was given steroids at [place] ED and was told that he might have to be admitted. Patient opted not to, tolerated PO and went home. Pt went to [facility] ED for worsting of symptoms.  At [facility] ED pt tachycardic 125-118, tmax 99.7, normotensive and 100% on RA  positive for EBV.  Physical exam revealed 4+ tonsils with exudates, airway patent, no epiglottis enlargement and no pooling of secretions. Culture so far negative, mimimal improvement with IV abx and steroids. EBV+. Symptoms c/f tonsillitis vs infectious mono.     - please re-culture and ask for diptheria culture to be sent (considering thick gray-white exudate covering tonsils)  - IV Clindamycin 600mg q8, can transition to PO clindamycin 300 TID once ready for discharge for a total of 10 days  - previous cultures negative   - Encouraging PO intake, if unable to tolerate, please give IVF  - Can continue clear liquid diet and advance as tolerated tonight  - warm salt water irrigations  - page with questions

## 2022-04-16 NOTE — PROGRESS NOTE ADULT - SUBJECTIVE AND OBJECTIVE BOX
**************************************************************  Alan Roblero, PGY3  Internal Medicine   pager: [phone] [facility]: [id number]  ***************************************************************    PROGRESS NOTE:     Patient is a 21y old  Male who presents with a chief complaint of Tonsillitis (15 Apr 2022 09:23)      SUBJECTIVE / OVERNIGHT EVENTS:  -This Am, patient had worsening throat pain, received ketorolac and had improvement in his throat pain. Denies any shortness of breath, nausea, headache, chest pain.     ADDITIONAL REVIEW OF SYSTEMS: 10 point ROS negative except per HPI    MEDICATIONS  (STANDING):  clindamycin IVPB 600 milliGRAM(s) IV Intermittent every 8 hours  FIRST- Mouthwash  BLM 10 milliLiter(s) Swish and Spit every 8 hours  lactated ringers. 1000 milliLiter(s) (125 mL/Hr) IV Continuous <Continuous>  lactobacillus acidophilus 1 Tablet(s) Oral three times a day    MEDICATIONS  (PRN):  acetaminophen     Tablet .. 650 milliGRAM(s) Oral every 6 hours PRN Temp greater or equal to 38C (100.4F), Mild Pain (1 - 3)  aluminum hydroxide/magnesium hydroxide/simethicone Suspension 30 milliLiter(s) Oral every 4 hours PRN Dyspepsia  ketorolac   Injectable 15 milliGRAM(s) IV Push every 8 hours PRN Moderate Pain (4 - 6)  melatonin 3 milliGRAM(s) Oral at bedtime PRN Insomnia      CAPILLARY BLOOD GLUCOSE        I&O's Summary    15 Apr 2022 07:01  -  16 Apr 2022 07:00  --------------------------------------------------------  IN: 0 mL / OUT: 1000 mL / NET: -1000 mL        PHYSICAL EXAM:  Vital Signs Last 24 Hrs  T(C): 36.6 (16 Apr 2022 05:57), Max: 36.7 (15 Apr 2022 15:00)  T(F): 97.8 (16 Apr 2022 05:57), Max: 98.1 (15 Apr 2022 15:00)  HR: 82 (16 Apr 2022 05:57) (82 - 100)  BP: 122/72 (16 Apr 2022 05:57) (118/64 - 128/76)  BP(mean): --  RR: 16 (16 Apr 2022 05:57) (16 - 18)  SpO2: 98% (16 Apr 2022 05:57) (98% - 100%)    General: Awake and alert.  No acute distress.  Head: Normocephalic, atraumatic.    Eyes: PERRL.  EOMI.  No scleral icterus.  No conjunctival pallor.  Mouth: Tonsils with exudates noted. Moist mucosa. tongue/trachea midline  Neck: b/l cervical lymphadenopathy present and tender to palpation. Supple. Trachea midline  Heart: RRR.  Normal S1 and S2.  No murmurs, rubs, or gallops.  No LE edema b/l.   Lungs:  CTAB.  No wheezes, crackles, or rhonchi.    Abdomen: BS+, soft, NT/ND.   Skin: Warm and dry.  No rashes.  Musculoskeletal: MESSER, no edema   Neuro: A&Ox4, non focal    LABS:                        13.2   10.53 )-----------( 240      ( 16 Apr 2022 08:22 )             38.9     04-16    137  |  101  |  8   ----------------------------<  83  3.9   |  24  |  0.85    Ca    9.1      16 Apr 2022 08:22  Phos  2.6     04-16  Mg     2.00     04-16    TPro  7.2  /  Alb  3.9  /  TBili  0.5  /  DBili  x   /  AST  23  /  ALT  44<H>  /  AlkPhos  97  04-16      Culture - Group A Streptococcus (collected 13 Apr 2022 18:18)  Source: .Throat  Final Report (15 Apr 2022 11:02):    No Streptococcus pyogenes (Group A) isolated

## 2022-04-16 NOTE — PROGRESS NOTE ADULT - PROBLEM SELECTOR PLAN 1
Likely 2/2 EBV infection  - ENT consulted. Recs appreciated.   - completed Decadron 8mg q8hrs x 3 doses  - IV Clindamycin 600mg q8, can transition to PO clindamycin 300 TID once ready for discharge for a total of 10 days. I discussed switching to unasyn, but ENT would like to keep clinda given broader coverage/penetration   - cultures negative, hiv negative   - Encouraging PO intake, advance diet as tolerated, increased IVF to 125/hr given weight  - magic mouth wash 10 ml every 8 hours swish and spit (first dose will be given this AM)

## 2022-04-16 NOTE — DISCHARGE NOTE PROVIDER - NSDCCPTREATMENT_GEN_ALL_CORE_FT
PRINCIPAL PROCEDURE  Procedure: CT scan  Findings and Treatment: IMPRESSION: Enlarged tonsils with no discrete abscess, but with BILATERAL   edema which may reflect early abscess formation.   Adenoidal hypertrophy.   Reactive cervical lymphadenopathy.

## 2022-04-16 NOTE — DISCHARGE NOTE PROVIDER - HOSPITAL COURSE
22 y/o male w no significant pmhx who presents to [facility] ED for tonsilar pain and swelling, w/ CT findings of enlarged tonsils w/ b/l edema, admitted for IV abx and pain control for tonsilitis. Patient was started on IV clindamycin and given pain medications and hydration. His swelling improved, and diet was advanced from liquid to soft diet which he tolerated. He was discharged on PO clindamycin 300 TID till 4/24/22 + ibuprofen 400 TID + lactobillus with close outpatient follow up with ENT.    20 y/o male w no significant pmhx who presents to [facility] ED for tonsilar pain and swelling, w/ CT findings of enlarged tonsils w/ b/l edema, admitted for IV abx and pain control for tonsilitis. Infectious work up of note was positive for EBV. Flexible laryngoscopy was performed and revealed patent airway and tonsils with exudate. Patient was started on decadron 8 q8 for 3 days, and was given IV clindamycin, pain medications and hydration. His swelling improved. Diet was advanced from liquid to soft diet which he tolerated. He was discharged on PO clindamycin 300 TID till 4/24/22 + ibuprofen 400 TID + lactobillus with close outpatient follow up with ENT.

## 2022-04-16 NOTE — PROGRESS NOTE ADULT - ATTENDING COMMENTS
- IV Clindamycin 600mg q8, can transition to PO clindamycin 300 TID once ready for discharge for a total of 10 days  - previous cultures negative   - Encouraging PO intake, if unable to tolerate, please give IVF  - Can continue clear liquid diet and advance as tolerated tonight  - warm salt water irrigations  - page with questions   Agree with above assessment and plan  Thank you for your referral  Esperanza Engel MD

## 2022-04-16 NOTE — DISCHARGE NOTE PROVIDER - NSDCCPCAREPLAN_GEN_ALL_CORE_FT
PRINCIPAL DISCHARGE DIAGNOSIS  Diagnosis: Tonsillitis  Assessment and Plan of Treatment: You came in with tonsillitis, which is an infection of the tonsils. We gave you IV clindamycin and your pain and swelling improved.  -Please take clindamycin 450 every 8 hours until 4/24/22.  -For pain, you can take ibuprofen 400 every 8 hours with food and water.   -If you start having fevers, return to the ED. If your pain worsens, return to the ED.   -Please follow up with your primary care doctor       PRINCIPAL DISCHARGE DIAGNOSIS  Diagnosis: Tonsillitis  Assessment and Plan of Treatment: You came in with tonsillitis, which is an infection of the tonsils. We gave you IV clindamycin and your pain and swelling improved.  -Please take clindamycin 450 every 8 hours until 4/24/22.  -For pain, you can take ibuprofen 400 every 8 hours with food and water.   -If you start having fevers, return to the ED. If your pain worsens, return to the ED.   -Please follow up with your primary care doctor      SECONDARY DISCHARGE DIAGNOSES  Diagnosis: EBV seropositivity  Assessment and Plan of Treatment: You were found to have positive EBV, a viral infection that causes mono. This may not be exactly the reason for your tonsilitis, but given your symptoms, we will advise you to avoid sharing drinks/meals or anything that may allow for contamination via saliva, for the next 10 days.

## 2022-04-16 NOTE — PROGRESS NOTE ADULT - ATTENDING COMMENTS
Patient seen and examined by myself , case discussed  with resident ,agree with the above finding and plan  21M with throat pain d/t acute tonsillitis in s/o acute EBV infection (mononucleosis).  CT c/f tonsillitis with edema, poss early tonsillar abscess.  PE notable for enlarged tonsillitis w/ white exudates    A/P   mononucleosis with tonsillitis and transaminitis   -ENT recs appreciated  -iv clindamycin, s/p decadron x2 doses per ENT, lactobacillus  -, pt felt better with liquid diet yesterday , will c/w Liquid diet today ,advance diet as tolerated  -ivf hydration  -ENT f/u noted, recommend,  IV Clindamycin 600mg q8, can transition to PO clindamycin 300 TID once ready for discharge for a total of 10 days  c/w supportive care with magic mouth wash  plan of care d/w patient at bedside

## 2022-04-16 NOTE — DISCHARGE NOTE PROVIDER - CARE PROVIDER_API CALL
Esperanza Engel)  Family Medicine  [address] [place] [address]  Phone: [phone]  Fax: [phone]  Follow Up Time: 2 weeks

## 2022-04-16 NOTE — DISCHARGE NOTE PROVIDER - NSFOLLOWUPCLINICS_GEN_ALL_ED_FT
[facility] - ENT  Otolaryngology (ENT)  [address] [place] [address]  Phone: [phone]  Fax:   Follow Up Time: 1 week

## 2022-04-16 NOTE — PROGRESS NOTE ADULT - PROBLEM SELECTOR PLAN 5
- DVT Ppx: Intermittent pneumatic compression devices  - Diet: soft diet  - Activity: Ambulate as tolerated

## 2022-04-16 NOTE — PROGRESS NOTE ADULT - ASSESSMENT
Patient is a 22 y/o male w no significant pmhx who presents to [facility] ED for tonsilar pain and swelling, w/ CT findings of enlarged tonsils w/ b/l edema, admitted for IV abx and pain control for tonsilitis.

## 2022-04-16 NOTE — PHYSICAL THERAPY INITIAL EVALUATION ADULT - PERTINENT HX OF CURRENT PROBLEM, REHAB EVAL
21 year old male w no significant pmhx presented to [facility] ED for tonsilar swelling x 2 days. Yesterday pt went to [place] ED   and CT shows enlarged tonsils w/ b/l edema. Patient was given steroids at [place] ED and was told that he might have to be admitted. Patient opted not to, tolerated PO and went home. Pt went to [facility] ED for worsting of symptoms.

## 2022-04-17 LAB
ALBUMIN SERPL ELPH-MCNC: 3.8 G/DL — SIGNIFICANT CHANGE UP (ref 3.3–5)
ALP SERPL-CCNC: 96 U/L — SIGNIFICANT CHANGE UP (ref 40–120)
ALT FLD-CCNC: 32 U/L — SIGNIFICANT CHANGE UP (ref 4–41)
ANION GAP SERPL CALC-SCNC: 14 MMOL/L — SIGNIFICANT CHANGE UP (ref 7–14)
AST SERPL-CCNC: 18 U/L — SIGNIFICANT CHANGE UP (ref 4–40)
BASOPHILS # BLD AUTO: 0.06 K/UL — SIGNIFICANT CHANGE UP (ref 0–0.2)
BASOPHILS NFR BLD AUTO: 0.5 % — SIGNIFICANT CHANGE UP (ref 0–2)
BILIRUB SERPL-MCNC: 0.5 MG/DL — SIGNIFICANT CHANGE UP (ref 0.2–1.2)
BUN SERPL-MCNC: 8 MG/DL — SIGNIFICANT CHANGE UP (ref 7–23)
CALCIUM SERPL-MCNC: 9.2 MG/DL — SIGNIFICANT CHANGE UP (ref 8.4–10.5)
CHLORIDE SERPL-SCNC: 101 MMOL/L — SIGNIFICANT CHANGE UP (ref 98–107)
CO2 SERPL-SCNC: 22 MMOL/L — SIGNIFICANT CHANGE UP (ref 22–31)
CREAT SERPL-MCNC: 0.73 MG/DL — SIGNIFICANT CHANGE UP (ref 0.5–1.3)
EGFR: 132 ML/MIN/1.73M2 — SIGNIFICANT CHANGE UP
EOSINOPHIL # BLD AUTO: 0 K/UL — SIGNIFICANT CHANGE UP (ref 0–0.5)
EOSINOPHIL NFR BLD AUTO: 0 % — SIGNIFICANT CHANGE UP (ref 0–6)
GLUCOSE SERPL-MCNC: 116 MG/DL — HIGH (ref 70–99)
HCT VFR BLD CALC: 40.4 % — SIGNIFICANT CHANGE UP (ref 39–50)
HGB BLD-MCNC: 14 G/DL — SIGNIFICANT CHANGE UP (ref 13–17)
IANC: 4.73 K/UL — SIGNIFICANT CHANGE UP (ref 1.8–7.4)
IMM GRANULOCYTES NFR BLD AUTO: 0.4 % — SIGNIFICANT CHANGE UP (ref 0–1.5)
LYMPHOCYTES # BLD AUTO: 5.99 K/UL — HIGH (ref 1–3.3)
LYMPHOCYTES # BLD AUTO: 51.3 % — HIGH (ref 13–44)
MAGNESIUM SERPL-MCNC: 2.1 MG/DL — SIGNIFICANT CHANGE UP (ref 1.6–2.6)
MCHC RBC-ENTMCNC: 30.3 PG — SIGNIFICANT CHANGE UP (ref 27–34)
MCHC RBC-ENTMCNC: 34.7 GM/DL — SIGNIFICANT CHANGE UP (ref 32–36)
MCV RBC AUTO: 87.4 FL — SIGNIFICANT CHANGE UP (ref 80–100)
MONOCYTES # BLD AUTO: 0.84 K/UL — SIGNIFICANT CHANGE UP (ref 0–0.9)
MONOCYTES NFR BLD AUTO: 7.2 % — SIGNIFICANT CHANGE UP (ref 2–14)
NEUTROPHILS # BLD AUTO: 4.73 K/UL — SIGNIFICANT CHANGE UP (ref 1.8–7.4)
NEUTROPHILS NFR BLD AUTO: 40.6 % — LOW (ref 43–77)
NRBC # BLD: 0 /100 WBCS — SIGNIFICANT CHANGE UP
NRBC # FLD: 0 K/UL — SIGNIFICANT CHANGE UP
PHOSPHATE SERPL-MCNC: 4.6 MG/DL — HIGH (ref 2.5–4.5)
PLATELET # BLD AUTO: 264 K/UL — SIGNIFICANT CHANGE UP (ref 150–400)
POTASSIUM SERPL-MCNC: 4.2 MMOL/L — SIGNIFICANT CHANGE UP (ref 3.5–5.3)
POTASSIUM SERPL-SCNC: 4.2 MMOL/L — SIGNIFICANT CHANGE UP (ref 3.5–5.3)
PROT SERPL-MCNC: 7.3 G/DL — SIGNIFICANT CHANGE UP (ref 6–8.3)
RBC # BLD: 4.62 M/UL — SIGNIFICANT CHANGE UP (ref 4.2–5.8)
RBC # FLD: 12.6 % — SIGNIFICANT CHANGE UP (ref 10.3–14.5)
SODIUM SERPL-SCNC: 137 MMOL/L — SIGNIFICANT CHANGE UP (ref 135–145)
WBC # BLD: 11.67 K/UL — HIGH (ref 3.8–10.5)
WBC # FLD AUTO: 11.67 K/UL — HIGH (ref 3.8–10.5)

## 2022-04-17 PROCEDURE — 99233 SBSQ HOSP IP/OBS HIGH 50: CPT | Mod: GC

## 2022-04-17 PROCEDURE — 99232 SBSQ HOSP IP/OBS MODERATE 35: CPT

## 2022-04-17 RX ORDER — PANTOPRAZOLE SODIUM 20 MG/1
40 TABLET, DELAYED RELEASE ORAL DAILY
Refills: 0 | Status: DISCONTINUED | OUTPATIENT
Start: 2022-04-17 | End: 2022-04-18

## 2022-04-17 RX ORDER — DEXAMETHASONE 0.5 MG/5ML
8 ELIXIR ORAL EVERY 8 HOURS
Refills: 0 | Status: DISCONTINUED | OUTPATIENT
Start: 2022-04-17 | End: 2022-04-17

## 2022-04-17 RX ORDER — DEXAMETHASONE 0.5 MG/5ML
8 ELIXIR ORAL EVERY 8 HOURS
Refills: 0 | Status: DISCONTINUED | OUTPATIENT
Start: 2022-04-17 | End: 2022-04-18

## 2022-04-17 RX ADMIN — DIPHENHYDRAMINE HYDROCHLORIDE AND LIDOCAINE HYDROCHLORIDE AND ALUMINUM HYDROXIDE AND MAGNESIUM HYDRO 10 MILLILITER(S): KIT at 06:34

## 2022-04-17 RX ADMIN — DIPHENHYDRAMINE HYDROCHLORIDE AND LIDOCAINE HYDROCHLORIDE AND ALUMINUM HYDROXIDE AND MAGNESIUM HYDRO 10 MILLILITER(S): KIT at 22:17

## 2022-04-17 RX ADMIN — Medication 1 TABLET(S): at 13:34

## 2022-04-17 RX ADMIN — SODIUM CHLORIDE 125 MILLILITER(S): 9 INJECTION, SOLUTION INTRAVENOUS at 06:38

## 2022-04-17 RX ADMIN — DIPHENHYDRAMINE HYDROCHLORIDE AND LIDOCAINE HYDROCHLORIDE AND ALUMINUM HYDROXIDE AND MAGNESIUM HYDRO 10 MILLILITER(S): KIT at 13:34

## 2022-04-17 RX ADMIN — Medication 1 TABLET(S): at 22:18

## 2022-04-17 RX ADMIN — Medication 101.6 MILLIGRAM(S): at 13:46

## 2022-04-17 RX ADMIN — Medication 3 MILLIGRAM(S): at 22:28

## 2022-04-17 RX ADMIN — SODIUM CHLORIDE 125 MILLILITER(S): 9 INJECTION, SOLUTION INTRAVENOUS at 22:26

## 2022-04-17 RX ADMIN — SODIUM CHLORIDE 125 MILLILITER(S): 9 INJECTION, SOLUTION INTRAVENOUS at 14:30

## 2022-04-17 RX ADMIN — Medication 101.6 MILLIGRAM(S): at 22:27

## 2022-04-17 RX ADMIN — Medication 101.6 MILLIGRAM(S): at 06:55

## 2022-04-17 RX ADMIN — Medication 100 MILLIGRAM(S): at 05:37

## 2022-04-17 RX ADMIN — Medication 100 MILLIGRAM(S): at 13:33

## 2022-04-17 RX ADMIN — Medication 100 MILLIGRAM(S): at 22:17

## 2022-04-17 RX ADMIN — Medication 1 TABLET(S): at 06:38

## 2022-04-17 NOTE — PROGRESS NOTE ADULT - ASSESSMENT
Patient is a 20 y/o male w no significant pmhx who presents to [facility] ED for tonsilar pain and swelling, w/ CT findings of enlarged tonsils w/ b/l edema, admitted for IV abx and pain control for tonsilitis.

## 2022-04-17 NOTE — PROGRESS NOTE ADULT - ATTENDING COMMENTS
IV Clindamycin 600mg q8, can transition to PO clindamycin 300 TID once ready for discharge for a total of 10 days  - previous cultures negative   - Encouraging PO intake, if unable to tolerate, please give IVF  - advance diet as tolerated   - warm salt water irrigations  - page with questions     Agree with above assessment and plan  Thank you for your referral  Esperanza Engel MD

## 2022-04-17 NOTE — PROGRESS NOTE ADULT - SUBJECTIVE AND OBJECTIVE BOX
INTERVAL HX:  Patient seen and examined at the bedside. Feels much better this afternoon. Tolerating diet, voice back to baseline.     Vital Signs Last 24 Hrs  T(C): 36.6 (16 Apr 2022 05:57), Max: 36.7 (15 Apr 2022 15:00)  T(F): 97.8 (16 Apr 2022 05:57), Max: 98.1 (15 Apr 2022 15:00)  HR: 82 (16 Apr 2022 05:57) (82 - 100)  BP: 122/72 (16 Apr 2022 05:57) (118/64 - 128/76)  BP(mean): --  RR: 16 (16 Apr 2022 05:57) (16 - 18)  SpO2: 98% (16 Apr 2022 05:57) (98% - 100%)    General: AAOx3, resting in bed, comfortable  Neck: soft and flat  NC: clear anteriorly   OC: tongue normal, FOM normal, OP with 4+ tonsils without significant exudate    A/P:   20 y/o male w no significant pmhx presented to [facility] ED for tonsilar swelling x 2 days. Yesterday pt went to [place] ED   and CT shows enlarged tonsils w/ b/l edema. Patient was given steroids at [place] ED and was told that he might have to be admitted. Patient opted not to, tolerated PO and went home. Pt went to [facility] ED for worsting of symptoms.  At [facility] ED pt tachycardic 125-118, tmax 99.7, normotensive and 100% on RA  positive for EBV.  Physical exam revealed 4+ tonsils with exudates, airway patent, no epiglottis enlargement and no pooling of secretions. Culture so far negative. Symptoms c/f tonsillitis vs infectious mono. Improving     - fu culture  - can stop/taper decadron   - IV Clindamycin 600mg q8, can transition to PO clindamycin 300 TID once ready for discharge for a total of 10 days  - previous cultures negative   - Encouraging PO intake, if unable to tolerate, please give IVF  - advance diet as tolerated   - warm salt water irrigations  - page with questions

## 2022-04-17 NOTE — PROGRESS NOTE ADULT - SUBJECTIVE AND OBJECTIVE BOX
Authored by Mary Anaya MD, PGY2  PATIENT:  MEHDI HANSEN  [id number]    CHIEF COMPLAINT:  Patient is a 21y old  Male who presents with a chief complaint of Tonsillitis (17 Apr 2022 07:09)      INTERVAL HISTORY OVERNIGHT EVENTS: LENNY overnight.         MEDICATIONS:  MEDICATIONS  (STANDING):  clindamycin IVPB 600 milliGRAM(s) IV Intermittent every 8 hours  dexAMETHasone  IVPB 8 milliGRAM(s) IV Intermittent every 8 hours  FIRST- Mouthwash  BLM 10 milliLiter(s) Swish and Spit every 8 hours  lactated ringers. 1000 milliLiter(s) (125 mL/Hr) IV Continuous <Continuous>  lactobacillus acidophilus 1 Tablet(s) Oral three times a day    MEDICATIONS  (PRN):  acetaminophen     Tablet .. 650 milliGRAM(s) Oral every 6 hours PRN Temp greater or equal to 38C (100.4F), Mild Pain (1 - 3)  aluminum hydroxide/magnesium hydroxide/simethicone Suspension 30 milliLiter(s) Oral every 4 hours PRN Dyspepsia  ketorolac   Injectable 15 milliGRAM(s) IV Push every 8 hours PRN Moderate Pain (4 - 6)  melatonin 3 milliGRAM(s) Oral at bedtime PRN Insomnia      ALLERGIES:  Allergies    No Known Allergies    Intolerances        OBJECTIVE:  ICU Vital Signs Last 24 Hrs  T(C): 37 (17 Apr 2022 06:01), Max: 37.3 (16 Apr 2022 14:23)  T(F): 98.6 (17 Apr 2022 06:01), Max: 99.1 (16 Apr 2022 14:23)  HR: 88 (17 Apr 2022 06:01) (88 - 94)  BP: 124/72 (17 Apr 2022 06:01) (121/71 - 129/84)  BP(mean): --  ABP: --  ABP(mean): --  RR: 18 (17 Apr 2022 06:01) (18 - 18)  SpO2: 98% (17 Apr 2022 06:01) (98% - 100%)          PHYSICAL EXAMINATION:  General: Awake and alert.  No acute distress.  Head: Normocephalic, atraumatic.    Eyes: PERRL.  EOMI.  No scleral icterus.  No conjunctival pallor.  Mouth: Tonsils with exudates noted. Moist mucosa. tongue/trachea midline  Neck: b/l cervical lymphadenopathy present and tender to palpation. Supple. Trachea midline  Heart: RRR.  Normal S1 and S2.  No murmurs, rubs, or gallops.  No LE edema b/l.   Lungs:  CTAB.  No wheezes, crackles, or rhonchi.    Abdomen: BS+, soft, NT/ND.   Skin: Warm and dry.  No rashes.  Musculoskeletal: MESSER, no edema   Neuro: A&Ox4, non focal        LABS:                          13.2   10.53 )-----------( 240      ( 16 Apr 2022 08:22 )             38.9     04-16    137  |  101  |  8   ----------------------------<  83  3.9   |  24  |  0.85    Ca    9.1      16 Apr 2022 08:22  Phos  2.6     04-16  Mg     2.00     04-16    TPro  7.2  /  Alb  3.9  /  TBili  0.5  /  DBili  x   /  AST  23  /  ALT  44<H>  /  AlkPhos  97  04-16    LIVER FUNCTIONS - ( 16 Apr 2022 08:22 )  Alb: 3.9 g/dL / Pro: 7.2 g/dL / ALK PHOS: 97 U/L / ALT: 44 U/L / AST: 23 U/L / GGT: x                       TELEMETRY:     EKG:     IMAGING:       Authored by Mary Anaya MD, PGY2  PATIENT:  MEHDI HANSEN  [id number]    CHIEF COMPLAINT:  Patient is a 21y old  Male who presents with a chief complaint of Tonsillitis (17 Apr 2022 07:09)      INTERVAL HISTORY OVERNIGHT EVENTS: LENNY overnight. Patient endorses continued pain in throat, could not tolerate soft diet yesterday from pain, tolerating liquid diet today. Does not feel voice is becoming more muffles, denies increased work of breathing.         MEDICATIONS:  MEDICATIONS  (STANDING):  clindamycin IVPB 600 milliGRAM(s) IV Intermittent every 8 hours  dexAMETHasone  IVPB 8 milliGRAM(s) IV Intermittent every 8 hours  FIRST- Mouthwash  BLM 10 milliLiter(s) Swish and Spit every 8 hours  lactated ringers. 1000 milliLiter(s) (125 mL/Hr) IV Continuous <Continuous>  lactobacillus acidophilus 1 Tablet(s) Oral three times a day    MEDICATIONS  (PRN):  acetaminophen     Tablet .. 650 milliGRAM(s) Oral every 6 hours PRN Temp greater or equal to 38C (100.4F), Mild Pain (1 - 3)  aluminum hydroxide/magnesium hydroxide/simethicone Suspension 30 milliLiter(s) Oral every 4 hours PRN Dyspepsia  ketorolac   Injectable 15 milliGRAM(s) IV Push every 8 hours PRN Moderate Pain (4 - 6)  melatonin 3 milliGRAM(s) Oral at bedtime PRN Insomnia      ALLERGIES:  Allergies    No Known Allergies    Intolerances        OBJECTIVE:  ICU Vital Signs Last 24 Hrs  T(C): 37 (17 Apr 2022 06:01), Max: 37.3 (16 Apr 2022 14:23)  T(F): 98.6 (17 Apr 2022 06:01), Max: 99.1 (16 Apr 2022 14:23)  HR: 88 (17 Apr 2022 06:01) (88 - 94)  BP: 124/72 (17 Apr 2022 06:01) (121/71 - 129/84)  BP(mean): --  ABP: --  ABP(mean): --  RR: 18 (17 Apr 2022 06:01) (18 - 18)  SpO2: 98% (17 Apr 2022 06:01) (98% - 100%)          PHYSICAL EXAMINATION:  General: Awake and alert.  No acute distress.  Head: Normocephalic, atraumatic.    Eyes: PERRL.  EOMI.  No scleral icterus.  No conjunctival pallor.  Mouth: Tonsils with exudates noted. Moist mucosa. tongue/trachea midline  Neck: b/l cervical lymphadenopathy present and tender to palpation. Supple. Trachea midline  Heart: RRR.  Normal S1 and S2.  No murmurs, rubs, or gallops.  No LE edema b/l.   Lungs:  CTAB.  No wheezes, crackles, or rhonchi.    Abdomen: BS+, soft, NT/ND.   Skin: Warm and dry.  No rashes.  Musculoskeletal: MESSER, no edema   Neuro: A&Ox4, non focal        LABS:                          13.2   10.53 )-----------( 240      ( 16 Apr 2022 08:22 )             38.9     04-16    137  |  101  |  8   ----------------------------<  83  3.9   |  24  |  0.85    Ca    9.1      16 Apr 2022 08:22  Phos  2.6     04-16  Mg     2.00     04-16    TPro  7.2  /  Alb  3.9  /  TBili  0.5  /  DBili  x   /  AST  23  /  ALT  44<H>  /  AlkPhos  97  04-16    LIVER FUNCTIONS - ( 16 Apr 2022 08:22 )  Alb: 3.9 g/dL / Pro: 7.2 g/dL / ALK PHOS: 97 U/L / ALT: 44 U/L / AST: 23 U/L / GGT: x                       TELEMETRY:     EKG:     IMAGING:

## 2022-04-17 NOTE — PROGRESS NOTE ADULT - SUBJECTIVE AND OBJECTIVE BOX
INTERVAL HX:  Patient seen and examined at the bedside. States slowly having symptomatic improvement. only tolerating liquids. comfortable on RA.     Vital Signs Last 24 Hrs  T(C): 36.6 (16 Apr 2022 05:57), Max: 36.7 (15 Apr 2022 15:00)  T(F): 97.8 (16 Apr 2022 05:57), Max: 98.1 (15 Apr 2022 15:00)  HR: 82 (16 Apr 2022 05:57) (82 - 100)  BP: 122/72 (16 Apr 2022 05:57) (118/64 - 128/76)  BP(mean): --  RR: 16 (16 Apr 2022 05:57) (16 - 18)  SpO2: 98% (16 Apr 2022 05:57) (98% - 100%)    General: AAOx3, resting in bed, comfortable  Neck: soft and flat  NC: clear anteriorly   OC: tongue normal, FOM normal, OP with 4+ tonsils without significant exudate    A/P:   22 y/o male w no significant pmhx presented to [facility] ED for tonsilar swelling x 2 days. Yesterday pt went to [place] ED   and CT shows enlarged tonsils w/ b/l edema. Patient was given steroids at [place] ED and was told that he might have to be admitted. Patient opted not to, tolerated PO and went home. Pt went to [facility] ED for worsting of symptoms.  At [facility] ED pt tachycardic 125-118, tmax 99.7, normotensive and 100% on RA  positive for EBV.  Physical exam revealed 4+ tonsils with exudates, airway patent, no epiglottis enlargement and no pooling of secretions. Culture so far negative, mimimal improvement with IV abx and steroids. EBV+. Symptoms c/f tonsillitis vs infectious mono.     - please re-culture and ask for diptheria culture to be sent (considering thick gray-white exudate covering tonsils)  - continue standing decadron 8q8h  - IV Clindamycin 600mg q8, can transition to PO clindamycin 300 TID once ready for discharge for a total of 10 days  - previous cultures negative   - Encouraging PO intake, if unable to tolerate, please give IVF  - advance diet as tolerated   - warm salt water irrigations  - page with questions

## 2022-04-17 NOTE — PROGRESS NOTE ADULT - ATTENDING COMMENTS
Patient seen and examined by myself , case discussed  with resident ,agree with the above finding and plan  21M with throat pain d/t acute tonsillitis in s/o acute EBV infection (mononucleosis).  CT c/f tonsillitis with edema, poss early tonsillar abscess.  PE notable for enlarged tonsillitis w/ white exudates  pt clinically feeling better , tolerating fluid diet , pt wants to c/w fluid diet for today     A/P   mononucleosis with tonsillitis and transaminitis   -ENT recs appreciated  -c/w iv clindamycin, c/w  decadron 8 mg q 8 hrs lactobacillus  -, pt felt better with liquid diet yesterday , will c/w Liquid diet today ,advance diet as tolerated  -ivf hydration  -ENT f/u noted, recommend,  IV Clindamycin 600mg q8, continue standing decadron 8q8h  - re-culture for  diptheria culture to be sent (considering thick gray-white exudate covering tonsils)  - c/w supportive care with magic mouth wash  - will add Protonix suspension as pt c/o some epigastric discomfort , on steroids   plan of care d/w patient and parents  at bedside

## 2022-04-17 NOTE — PROGRESS NOTE ADULT - PROBLEM SELECTOR PLAN 1
Likely 2/2 EBV infection  - ENT consulted. Recs appreciated.   - completed Decadron 8mg q8hrs x 3 doses, today ENT recommends to continue decadron indefinitely, recommend to hold off repeat CT scan   - IV Clindamycin 600mg q8, can transition to PO clindamycin 300 TID once ready for discharge for a total of 10 days. I discussed switching to unasyn, but ENT would like to keep clinda given broader coverage/penetration   - cultures negative, hiv negative; ENT requesting repeat throat cultures, especially diptheria   - Encouraging PO intake, advance diet as tolerated, increased IVF to 125/hr given weight  - magic mouth wash 10 ml every 8 hours swish and spit (first dose will be given this AM) Likely 2/2 EBV infection  - ENT consulted. Recs appreciated.   - completed Decadron 8mg q8hrs x 3 doses, today ENT recommends to continue decadron indefinitely, recommend to hold off repeat CT scan   - IV Clindamycin 600mg q8, can transition to PO clindamycin 300 TID once ready for discharge for a total of 10 days. I discussed switching to unasyn, but ENT would like to keep clinda given broader coverage/penetration   - cultures negative, hiv negative; ENT requesting repeat throat cultures, especially diptheria, reordered  - Encouraging PO intake, advance diet as tolerated, increased IVF to 125/hr given weight  - magic mouth wash 10 ml every 8 hours swish and spit (first dose will be given this AM

## 2022-04-18 ENCOUNTER — TRANSCRIPTION ENCOUNTER (OUTPATIENT)
Age: 22
End: 2022-04-18

## 2022-04-18 VITALS
HEART RATE: 82 BPM | DIASTOLIC BLOOD PRESSURE: 64 MMHG | SYSTOLIC BLOOD PRESSURE: 125 MMHG | TEMPERATURE: 98 F | RESPIRATION RATE: 17 BRPM | OXYGEN SATURATION: 99 %

## 2022-04-18 LAB
ALBUMIN SERPL ELPH-MCNC: 4.1 G/DL — SIGNIFICANT CHANGE UP (ref 3.3–5)
ALP SERPL-CCNC: 95 U/L — SIGNIFICANT CHANGE UP (ref 40–120)
ALT FLD-CCNC: 33 U/L — SIGNIFICANT CHANGE UP (ref 4–41)
ANION GAP SERPL CALC-SCNC: 12 MMOL/L — SIGNIFICANT CHANGE UP (ref 7–14)
AST SERPL-CCNC: 16 U/L — SIGNIFICANT CHANGE UP (ref 4–40)
BASOPHILS # BLD AUTO: 0 K/UL — SIGNIFICANT CHANGE UP (ref 0–0.2)
BASOPHILS NFR BLD AUTO: 0 % — SIGNIFICANT CHANGE UP (ref 0–2)
BILIRUB SERPL-MCNC: 0.4 MG/DL — SIGNIFICANT CHANGE UP (ref 0.2–1.2)
BUN SERPL-MCNC: 7 MG/DL — SIGNIFICANT CHANGE UP (ref 7–23)
CALCIUM SERPL-MCNC: 9.8 MG/DL — SIGNIFICANT CHANGE UP (ref 8.4–10.5)
CHLORIDE SERPL-SCNC: 101 MMOL/L — SIGNIFICANT CHANGE UP (ref 98–107)
CO2 SERPL-SCNC: 25 MMOL/L — SIGNIFICANT CHANGE UP (ref 22–31)
CREAT SERPL-MCNC: 0.73 MG/DL — SIGNIFICANT CHANGE UP (ref 0.5–1.3)
EGFR: 132 ML/MIN/1.73M2 — SIGNIFICANT CHANGE UP
EOSINOPHIL # BLD AUTO: 0 K/UL — SIGNIFICANT CHANGE UP (ref 0–0.5)
EOSINOPHIL NFR BLD AUTO: 0 % — SIGNIFICANT CHANGE UP (ref 0–6)
GLUCOSE SERPL-MCNC: 117 MG/DL — HIGH (ref 70–99)
HCT VFR BLD CALC: 42.1 % — SIGNIFICANT CHANGE UP (ref 39–50)
HGB BLD-MCNC: 14.8 G/DL — SIGNIFICANT CHANGE UP (ref 13–17)
IANC: 5.43 K/UL — SIGNIFICANT CHANGE UP (ref 1.8–7.4)
LYMPHOCYTES # BLD AUTO: 0.2 K/UL — LOW (ref 1–3.3)
LYMPHOCYTES # BLD AUTO: 1.7 % — LOW (ref 13–44)
MAGNESIUM SERPL-MCNC: 2.1 MG/DL — SIGNIFICANT CHANGE UP (ref 1.6–2.6)
MCHC RBC-ENTMCNC: 30.3 PG — SIGNIFICANT CHANGE UP (ref 27–34)
MCHC RBC-ENTMCNC: 35.2 GM/DL — SIGNIFICANT CHANGE UP (ref 32–36)
MCV RBC AUTO: 86.1 FL — SIGNIFICANT CHANGE UP (ref 80–100)
MONOCYTES # BLD AUTO: 1.21 K/UL — HIGH (ref 0–0.9)
MONOCYTES NFR BLD AUTO: 10.4 % — SIGNIFICANT CHANGE UP (ref 2–14)
NEUTROPHILS # BLD AUTO: 5.77 K/UL — SIGNIFICANT CHANGE UP (ref 1.8–7.4)
NEUTROPHILS NFR BLD AUTO: 49.6 % — SIGNIFICANT CHANGE UP (ref 43–77)
PHOSPHATE SERPL-MCNC: 3.7 MG/DL — SIGNIFICANT CHANGE UP (ref 2.5–4.5)
PLATELET # BLD AUTO: 322 K/UL — SIGNIFICANT CHANGE UP (ref 150–400)
POTASSIUM SERPL-MCNC: 4.3 MMOL/L — SIGNIFICANT CHANGE UP (ref 3.5–5.3)
POTASSIUM SERPL-SCNC: 4.3 MMOL/L — SIGNIFICANT CHANGE UP (ref 3.5–5.3)
PROT SERPL-MCNC: 7.9 G/DL — SIGNIFICANT CHANGE UP (ref 6–8.3)
RBC # BLD: 4.89 M/UL — SIGNIFICANT CHANGE UP (ref 4.2–5.8)
RBC # FLD: 12.5 % — SIGNIFICANT CHANGE UP (ref 10.3–14.5)
SODIUM SERPL-SCNC: 138 MMOL/L — SIGNIFICANT CHANGE UP (ref 135–145)
WBC # BLD: 11.63 K/UL — HIGH (ref 3.8–10.5)
WBC # FLD AUTO: 11.63 K/UL — HIGH (ref 3.8–10.5)

## 2022-04-18 PROCEDURE — 99239 HOSP IP/OBS DSCHRG MGMT >30: CPT | Mod: GC

## 2022-04-18 PROCEDURE — 99232 SBSQ HOSP IP/OBS MODERATE 35: CPT

## 2022-04-18 RX ORDER — LACTOBACILLUS ACIDOPHILUS 100MM CELL
1 CAPSULE ORAL
Qty: 42 | Refills: 0
Start: 2022-04-18 | End: 2022-05-01

## 2022-04-18 RX ORDER — DIPHENHYDRAMINE HYDROCHLORIDE AND LIDOCAINE HYDROCHLORIDE AND ALUMINUM HYDROXIDE AND MAGNESIUM HYDRO
10 KIT
Qty: 420 | Refills: 0
Start: 2022-04-18 | End: 2022-05-01

## 2022-04-18 RX ADMIN — Medication 101.6 MILLIGRAM(S): at 07:33

## 2022-04-18 RX ADMIN — SODIUM CHLORIDE 125 MILLILITER(S): 9 INJECTION, SOLUTION INTRAVENOUS at 07:35

## 2022-04-18 RX ADMIN — Medication 100 MILLIGRAM(S): at 06:25

## 2022-04-18 RX ADMIN — DIPHENHYDRAMINE HYDROCHLORIDE AND LIDOCAINE HYDROCHLORIDE AND ALUMINUM HYDROXIDE AND MAGNESIUM HYDRO 10 MILLILITER(S): KIT at 06:27

## 2022-04-18 RX ADMIN — Medication 1 TABLET(S): at 06:27

## 2022-04-18 NOTE — PROGRESS NOTE ADULT - SUBJECTIVE AND OBJECTIVE BOX
INTERVAL HX:  Patient seen and examined at the bedside. Feels much better. Tolerating diet.     Vital Signs Last 24 Hrs  T(C): 36.7 (18 Apr 2022 06:48), Max: 36.7 (18 Apr 2022 06:48)  T(F): 98.1 (18 Apr 2022 06:48), Max: 98.1 (18 Apr 2022 06:48)  HR: 71 (18 Apr 2022 06:48) (69 - 79)  BP: 126/81 (18 Apr 2022 06:48) (119/73 - 126/81)  BP(mean): --  RR: 17 (18 Apr 2022 06:48) (17 - 18)  SpO2: 99% (18 Apr 2022 06:48) (98% - 100%)  General: AAOx3, resting in bed, comfortable  Neck: soft and flat  NC: clear anteriorly   OC: tongue normal, FOM normal, OP with 4+ tonsils without significant exudate    A/P:   20 y/o male w no significant pmhx presented to [facility] ED for tonsilar swelling x 2 days. Yesterday pt went to [place] ED   and CT shows enlarged tonsils w/ b/l edema. Patient was given steroids at [place] ED and was told that he might have to be admitted. Patient opted not to, tolerated PO and went home. Pt went to [facility] ED for worsting of symptoms.  At [facility] ED pt tachycardic 125-118, tmax 99.7, normotensive and 100% on RA  positive for EBV.  Physical exam revealed 4+ tonsils with exudates, airway patent, no epiglottis enlargement and no pooling of secretions. Culture so far negative. Symptoms c/f tonsillitis vs infectious mono. Improving     - fu culture  - can dc decadron  - can transition to PO clindamycin 300 TID for a total of 10 days and discharge  - continue soft diet  - warm salt water irrigations  - will sign off

## 2022-04-18 NOTE — PROGRESS NOTE ADULT - SUBJECTIVE AND OBJECTIVE BOX
PROGRESS NOTE:   Authored by Dr. Marybeth Hou MD (PGY-2). Available on TEAMS.    Patient is a 21y old  Male who presents with a chief complaint of Tonsillitis (17 Apr 2022 18:30)      SUBJECTIVE / OVERNIGHT EVENTS:  No acute events overnight.     ADDITIONAL REVIEW OF SYSTEMS:  Patient denies fevers, chills, chest pain, shortness of breath, nausea, abdominal pain, diarrhea, constipation, dysuria, leg swelling, headache, light headedness.    MEDICATIONS  (STANDING):  clindamycin IVPB 600 milliGRAM(s) IV Intermittent every 8 hours  dexAMETHasone  IVPB 8 milliGRAM(s) IV Intermittent every 8 hours  FIRST- Mouthwash  BLM 10 milliLiter(s) Swish and Spit every 8 hours  lactated ringers. 1000 milliLiter(s) (125 mL/Hr) IV Continuous <Continuous>  pantoprazole   Suspension 40 milliGRAM(s) Oral daily    MEDICATIONS  (PRN):  acetaminophen     Tablet .. 650 milliGRAM(s) Oral every 6 hours PRN Temp greater or equal to 38C (100.4F), Mild Pain (1 - 3)  aluminum hydroxide/magnesium hydroxide/simethicone Suspension 30 milliLiter(s) Oral every 4 hours PRN Dyspepsia  ketorolac   Injectable 15 milliGRAM(s) IV Push every 8 hours PRN Moderate Pain (4 - 6)  melatonin 3 milliGRAM(s) Oral at bedtime PRN Insomnia      CAPILLARY BLOOD GLUCOSE        I&O's Summary    17 Apr 2022 07:01  -  18 Apr 2022 07:00  --------------------------------------------------------  IN: 2800 mL / OUT: 0 mL / NET: 2800 mL        PHYSICAL EXAM:  Vital Signs Last 24 Hrs  T(C): 36.7 (18 Apr 2022 06:48), Max: 36.7 (18 Apr 2022 06:48)  T(F): 98.1 (18 Apr 2022 06:48), Max: 98.1 (18 Apr 2022 06:48)  HR: 71 (18 Apr 2022 06:48) (69 - 79)  BP: 126/81 (18 Apr 2022 06:48) (119/73 - 126/81)  BP(mean): --  RR: 17 (18 Apr 2022 06:48) (17 - 18)  SpO2: 99% (18 Apr 2022 06:48) (98% - 100%)    CONSTITUTIONAL: NAD, well-developed  RESPIRATORY: Normal respiratory effort; lungs are clear to auscultation bilaterally  CARDIOVASCULAR: Regular rate and rhythm, normal S1 and S2, no murmur/rub/gallop; No lower extremity edema; Peripheral pulses are 2+ bilaterally  ABDOMEN: Nontender to palpation, normoactive bowel sounds, no rebound/guarding; No hepatosplenomegaly  MUSCLOSKELETAL: no clubbing or cyanosis of digits; no joint swelling or tenderness to palpation  PSYCH: A+O to person, place, and time; affect appropriate    LABS:                        14.0   11.67 )-----------( 264      ( 17 Apr 2022 07:43 )             40.4     04-17    137  |  101  |  8   ----------------------------<  116<H>  4.2   |  22  |  0.73    Ca    9.2      17 Apr 2022 07:43  Phos  4.6     04-17  Mg     2.10     04-17    TPro  7.3  /  Alb  3.8  /  TBili  0.5  /  DBili  x   /  AST  18  /  ALT  32  /  AlkPhos  96  04-17                Tele Reviewed:    RADIOLOGY & ADDITIONAL TESTS:  Results Reviewed:   Imaging Personally Reviewed:  Electrocardiogram Personally Reviewed:     PROGRESS NOTE:   Authored by Dr. Marybeth Hou MD (PGY-2). Available on TEAMS.    Patient is a 21y old  Male who presents with a chief complaint of Tonsillitis (17 Apr 2022 18:30)      SUBJECTIVE / OVERNIGHT EVENTS:  No acute events overnight. Pt reports feeling much better, able to eat soft and small bite sized meals. He inquired plans to return home.     ADDITIONAL REVIEW OF SYSTEMS:  Patient denies fevers, chills, chest pain, shortness of breath, nausea, abdominal pain, diarrhea, constipation, dysuria, leg swelling, headache, light headedness.    MEDICATIONS  (STANDING):  clindamycin IVPB 600 milliGRAM(s) IV Intermittent every 8 hours  dexAMETHasone  IVPB 8 milliGRAM(s) IV Intermittent every 8 hours  FIRST- Mouthwash  BLM 10 milliLiter(s) Swish and Spit every 8 hours  lactated ringers. 1000 milliLiter(s) (125 mL/Hr) IV Continuous <Continuous>  pantoprazole   Suspension 40 milliGRAM(s) Oral daily    MEDICATIONS  (PRN):  acetaminophen     Tablet .. 650 milliGRAM(s) Oral every 6 hours PRN Temp greater or equal to 38C (100.4F), Mild Pain (1 - 3)  aluminum hydroxide/magnesium hydroxide/simethicone Suspension 30 milliLiter(s) Oral every 4 hours PRN Dyspepsia  ketorolac   Injectable 15 milliGRAM(s) IV Push every 8 hours PRN Moderate Pain (4 - 6)  melatonin 3 milliGRAM(s) Oral at bedtime PRN Insomnia      CAPILLARY BLOOD GLUCOSE        I&O's Summary    17 Apr 2022 07:01  -  18 Apr 2022 07:00  --------------------------------------------------------  IN: 2800 mL / OUT: 0 mL / NET: 2800 mL        PHYSICAL EXAM:  Vital Signs Last 24 Hrs  T(C): 36.7 (18 Apr 2022 06:48), Max: 36.7 (18 Apr 2022 06:48)  T(F): 98.1 (18 Apr 2022 06:48), Max: 98.1 (18 Apr 2022 06:48)  HR: 71 (18 Apr 2022 06:48) (69 - 79)  BP: 126/81 (18 Apr 2022 06:48) (119/73 - 126/81)  BP(mean): --  RR: 17 (18 Apr 2022 06:48) (17 - 18)  SpO2: 99% (18 Apr 2022 06:48) (98% - 100%)    CONSTITUTIONAL: NAD, well-developed  OROPHARYNX: b/l enlarged tonsils with erythema and white exudates   RESPIRATORY: Normal respiratory effort; lungs are clear to auscultation bilaterally  CARDIOVASCULAR: Regular rate and rhythm, normal S1 and S2, no murmur/rub/gallop; No lower extremity edema; Peripheral pulses are 2+ bilaterally  ABDOMEN: Nontender to palpation, normoactive bowel sounds, no rebound/guarding  MUSCLOSKELETAL: no clubbing or cyanosis of digits; no joint swelling or tenderness to palpation  PSYCH: A+O to person, place, and time; affect appropriate    LABS:                        14.0   11.67 )-----------( 264      ( 17 Apr 2022 07:43 )             40.4     04-17    137  |  101  |  8   ----------------------------<  116<H>  4.2   |  22  |  0.73    Ca    9.2      17 Apr 2022 07:43  Phos  4.6     04-17  Mg     2.10     04-17    TPro  7.3  /  Alb  3.8  /  TBili  0.5  /  DBili  x   /  AST  18  /  ALT  32  /  AlkPhos  96  04-17

## 2022-04-18 NOTE — PROGRESS NOTE ADULT - REASON FOR ADMISSION
Tonsillitis

## 2022-04-18 NOTE — PROGRESS NOTE ADULT - ASSESSMENT
22 y/o male w no significant pmhx who presents to [facility] ED for tonsilar pain and swelling, w/ CT findings of enlarged tonsils w/ b/l edema, admitted for IV abx and pain control for tonsilitis. 22 y/o male w no significant pmhx who presents to [facility] ED for tonsilar pain and swelling, w/ CT findings of enlarged tonsils w/ b/l edema, admitted for IV abx and pain control for tonsilitis. Clinically improved, stable to be discharged today.

## 2022-04-18 NOTE — DISCHARGE NOTE NURSING/CASE MANAGEMENT/SOCIAL WORK - NSDCPEFALRISK_GEN_ALL_CORE
For information on Fall & Injury Prevention, visit: [email or web address] OR  [email or web address] OR  https://www.cdc.gov/steadi/patient.html

## 2022-04-18 NOTE — DISCHARGE NOTE NURSING/CASE MANAGEMENT/SOCIAL WORK - PATIENT PORTAL LINK FT
You can access the FollowMyHealth Patient Portal offered by [facility] by registering at the following website: [email or web address]. By joining [facility]’s FollowMyHealth portal, you will also be able to view your health information using other applications (apps) compatible with our system.

## 2022-04-18 NOTE — PROGRESS NOTE ADULT - PROBLEM SELECTOR PLAN 2
- Recommend Decadron 8mg q8hrs x 3 doses   - IV Clindamycin 600mg q8, can transition to PO clindamycin 300 TID once ready for discharge for a total of 10 days  - f/u cultures   - Encouraging PO intake, if unable to tolerate, please give IVF  - Can continue clear liquid diet and advance as tolerated tonight  - [employer] mouth wash 10 ml every 8 hours swish and spit  - Case discussed with Dr. Engel
-as above

## 2022-04-18 NOTE — PROGRESS NOTE ADULT - ATTENDING COMMENTS
Patient seen and examined by myself , case discussed  with resident ,agree with the above finding and plan  21M with throat pain d/t acute tonsillitis in s/o acute EBV infection (mononucleosis).  CT c/f tonsillitis with edema, poss early tonsillar abscess.  PE notable for enlarged tonsillitis w/ white exudates, improved    A/P   mononucleosis with tonsillitis and transaminitis   -improving, tolerating soft diet, off steroid  -dc home today on clindamycin to complete 10 day course  -ENT recs appreciated Patient seen and examined by myself , case discussed  with resident ,agree with the above finding and plan  21M with throat pain d/t acute tonsillitis in s/o acute EBV infection (mononucleosis).  CT c/f tonsillitis with edema, poss early tonsillar abscess.  PE notable for enlarged tonsillitis w/ white exudates, improved    A/P   mononucleosis with tonsillitis and transaminitis   -improving, tolerating soft diet, off steroid  -dc home today on clindamycin to complete 10 day course  -ENT recs appreciated  Time spent on discharge 31 minutes coordinating discharge plan and discussing with patient and family.

## 2022-04-18 NOTE — PROGRESS NOTE ADULT - PROBLEM SELECTOR PLAN 1
Likely 2/2 EBV infection  - ENT consulted. Recs appreciated.   - completed Decadron 8mg q8hrs x 3 doses, ENT recommends to continue decadron indefinitely, recommend to hold off repeat CT scan   - IV Clindamycin 600mg q8, can transition to PO clindamycin 300 TID once ready for discharge for a total of 10 days. I discussed switching to unasyn, but ENT would like to keep clinda given broader coverage/penetration   - cultures negative, hiv negative; ENT requesting repeat throat cultures, especially diptheria, reordered  - Encouraging PO intake, advance diet as tolerated, increased IVF to 125/hr given weight  - magic mouth wash 10 ml every 8 hours swish and spit (first dose will be given this AM Likely 2/2 EBV infection vs bacterial ?   - ENT consulted. Recs appreciated.   - completed Decadron 8mg q8hrs x 3 doses, and continued on 4/17 given persistent symptoms. ENT also recommend to hold off repeat CT scan.   - IV Clindamycin 600mg q8, can transition to PO clindamycin 450 TID once ready for discharge for a total of 10 days. I discussed switching to unasyn, but ENT would like to keep clinda given broader coverage/penetration   - cultures negative, hiv negative; ENT requesting repeat throat cultures, especially diptheria, reordered  - Encouraging PO intake, advance diet as tolerated, increased IVF to 125/hr given weight  - magic mouth wash 10 ml every 8 hours swish and spit  - clinically reporting improvement in symptoms. Decadron discontinued. DC home today

## 2022-06-14 ENCOUNTER — NON-APPOINTMENT (OUTPATIENT)
Age: 22
End: 2022-06-14

## 2022-07-29 NOTE — ED ADULT NURSE NOTE - CAS DISCH ACCOMP BY
Parent(s)/Self Consent: Written consent was obtained and risks were reviewed including but not limited to scarring, infection, bleeding, scabbing, incomplete removal, nerve damage and allergy to anesthesia.

## 2023-03-11 NOTE — ED ADULT NURSE NOTE - NS_SISCREENINGSR_GEN_ALL_ED
Bedside and Verbal shift change report given to [facility] (oncoming nurse) by Ilsa Kendall RN (offgoing nurse). Report included the following information SBAR and Kardex. Negative

## 2024-03-05 ENCOUNTER — NON-APPOINTMENT (OUTPATIENT)
Age: 24
End: 2024-03-05

## 2024-11-15 ENCOUNTER — NON-APPOINTMENT (OUTPATIENT)
Age: 24
End: 2024-11-15

## 2025-01-22 ENCOUNTER — NON-APPOINTMENT (OUTPATIENT)
Age: 25
End: 2025-01-22

## 2025-04-15 ENCOUNTER — NON-APPOINTMENT (OUTPATIENT)
Age: 25
End: 2025-04-15